# Patient Record
Sex: FEMALE | Race: BLACK OR AFRICAN AMERICAN | Employment: FULL TIME | URBAN - METROPOLITAN AREA
[De-identification: names, ages, dates, MRNs, and addresses within clinical notes are randomized per-mention and may not be internally consistent; named-entity substitution may affect disease eponyms.]

---
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
## 2022-06-10 ENCOUNTER — HOSPITAL ENCOUNTER (EMERGENCY)
Age: 22
Discharge: HOME | End: 2022-06-10
Payer: COMMERCIAL

## 2022-06-10 ENCOUNTER — APPOINTMENT (OUTPATIENT)
Dept: ULTRASOUND IMAGING | Age: 22
End: 2022-06-10
Payer: COMMERCIAL

## 2022-06-10 VITALS
OXYGEN SATURATION: 98 % | TEMPERATURE: 97.3 F | RESPIRATION RATE: 18 BRPM | DIASTOLIC BLOOD PRESSURE: 83 MMHG | HEART RATE: 86 BPM | SYSTOLIC BLOOD PRESSURE: 121 MMHG | WEIGHT: 157.63 LBS | HEIGHT: 64 IN | BODY MASS INDEX: 26.91 KG/M2

## 2022-06-10 DIAGNOSIS — O23.42 URINARY TRACT INFECTION IN MOTHER DURING SECOND TRIMESTER OF PREGNANCY: ICD-10-CM

## 2022-06-10 DIAGNOSIS — R11.0 NAUSEA: ICD-10-CM

## 2022-06-10 DIAGNOSIS — Z3A.14 PREGNANCY WITH 14 COMPLETED WEEKS GESTATION: Primary | ICD-10-CM

## 2022-06-10 LAB
A/G RATIO: 1.4 (ref 1.1–2.2)
ALBUMIN SERPL-MCNC: 4.5 G/DL (ref 3.4–5)
ALP BLD-CCNC: 70 U/L (ref 40–129)
ALT SERPL-CCNC: 13 U/L (ref 10–40)
ANION GAP SERPL CALCULATED.3IONS-SCNC: 13 MMOL/L (ref 3–16)
AST SERPL-CCNC: 12 U/L (ref 15–37)
BACTERIA WET PREP: NORMAL
BACTERIA: ABNORMAL /HPF
BASOPHILS ABSOLUTE: 0 K/UL (ref 0–0.2)
BASOPHILS RELATIVE PERCENT: 0.6 %
BILIRUB SERPL-MCNC: <0.2 MG/DL (ref 0–1)
BILIRUBIN URINE: NEGATIVE
BLOOD, URINE: NEGATIVE
BUN BLDV-MCNC: 4 MG/DL (ref 7–20)
CALCIUM SERPL-MCNC: 9.7 MG/DL (ref 8.3–10.6)
CHLORIDE BLD-SCNC: 104 MMOL/L (ref 99–110)
CLARITY: ABNORMAL
CLUE CELLS: NORMAL
CO2: 21 MMOL/L (ref 21–32)
COLOR: YELLOW
CREAT SERPL-MCNC: <0.5 MG/DL (ref 0.6–1.1)
EOSINOPHILS ABSOLUTE: 0.1 K/UL (ref 0–0.6)
EOSINOPHILS RELATIVE PERCENT: 2 %
EPITHELIAL CELLS WET PREP: NORMAL
EPITHELIAL CELLS, UA: 15 /HPF (ref 0–5)
GFR AFRICAN AMERICAN: >60
GFR NON-AFRICAN AMERICAN: >60
GLUCOSE BLD-MCNC: 81 MG/DL (ref 70–99)
GLUCOSE URINE: NEGATIVE MG/DL
GONADOTROPIN, CHORIONIC (HCG) QUANT: NORMAL MIU/ML
HCT VFR BLD CALC: 37.9 % (ref 36–48)
HEMOGLOBIN: 12.7 G/DL (ref 12–16)
HYALINE CASTS: 0 /LPF (ref 0–8)
KETONES, URINE: NEGATIVE MG/DL
LEUKOCYTE ESTERASE, URINE: ABNORMAL
LYMPHOCYTES ABSOLUTE: 1.4 K/UL (ref 1–5.1)
LYMPHOCYTES RELATIVE PERCENT: 18.7 %
MCH RBC QN AUTO: 28.6 PG (ref 26–34)
MCHC RBC AUTO-ENTMCNC: 33.6 G/DL (ref 31–36)
MCV RBC AUTO: 85.2 FL (ref 80–100)
MICROSCOPIC EXAMINATION: YES
MONOCYTES ABSOLUTE: 0.8 K/UL (ref 0–1.3)
MONOCYTES RELATIVE PERCENT: 10.4 %
NEUTROPHILS ABSOLUTE: 5.1 K/UL (ref 1.7–7.7)
NEUTROPHILS RELATIVE PERCENT: 68.3 %
NITRITE, URINE: POSITIVE
PDW BLD-RTO: 14.4 % (ref 12.4–15.4)
PH UA: 6.5 (ref 5–8)
PLATELET # BLD: 219 K/UL (ref 135–450)
PMV BLD AUTO: 9.2 FL (ref 5–10.5)
POTASSIUM SERPL-SCNC: 3.4 MMOL/L (ref 3.5–5.1)
PROTEIN UA: NEGATIVE MG/DL
RBC # BLD: 4.45 M/UL (ref 4–5.2)
RBC UA: 1 /HPF (ref 0–4)
RBC WET PREP: NORMAL
SODIUM BLD-SCNC: 138 MMOL/L (ref 136–145)
SOURCE WET PREP: NORMAL
SPECIFIC GRAVITY UA: 1.02 (ref 1–1.03)
TOTAL PROTEIN: 7.7 G/DL (ref 6.4–8.2)
TRICHOMONAS PREP: NORMAL
URINE REFLEX TO CULTURE: YES
URINE TYPE: ABNORMAL
UROBILINOGEN, URINE: 1 E.U./DL
WBC # BLD: 7.5 K/UL (ref 4–11)
WBC UA: 38 /HPF (ref 0–5)
WBC WET PREP: NORMAL
YEAST WET PREP: NORMAL

## 2022-06-10 PROCEDURE — 99284 EMERGENCY DEPT VISIT MOD MDM: CPT

## 2022-06-10 PROCEDURE — 87591 N.GONORRHOEAE DNA AMP PROB: CPT

## 2022-06-10 PROCEDURE — 36415 COLL VENOUS BLD VENIPUNCTURE: CPT

## 2022-06-10 PROCEDURE — 96374 THER/PROPH/DIAG INJ IV PUSH: CPT

## 2022-06-10 PROCEDURE — 87210 SMEAR WET MOUNT SALINE/INK: CPT

## 2022-06-10 PROCEDURE — 84702 CHORIONIC GONADOTROPIN TEST: CPT

## 2022-06-10 PROCEDURE — 6360000002 HC RX W HCPCS: Performed by: PHYSICIAN ASSISTANT

## 2022-06-10 PROCEDURE — 85025 COMPLETE CBC W/AUTO DIFF WBC: CPT

## 2022-06-10 PROCEDURE — 80053 COMPREHEN METABOLIC PANEL: CPT

## 2022-06-10 PROCEDURE — 87077 CULTURE AEROBIC IDENTIFY: CPT

## 2022-06-10 PROCEDURE — 76805 OB US >/= 14 WKS SNGL FETUS: CPT

## 2022-06-10 PROCEDURE — 87491 CHLMYD TRACH DNA AMP PROBE: CPT

## 2022-06-10 PROCEDURE — 81001 URINALYSIS AUTO W/SCOPE: CPT

## 2022-06-10 PROCEDURE — 87086 URINE CULTURE/COLONY COUNT: CPT

## 2022-06-10 RX ORDER — PNV NO.95/FERROUS FUM/FOLIC AC 28MG-0.8MG
1 TABLET ORAL DAILY
Qty: 30 TABLET | Refills: 0 | Status: SHIPPED | OUTPATIENT
Start: 2022-06-10

## 2022-06-10 RX ORDER — NITROFURANTOIN 25; 75 MG/1; MG/1
100 CAPSULE ORAL 2 TIMES DAILY
Qty: 20 CAPSULE | Refills: 0 | Status: SHIPPED | OUTPATIENT
Start: 2022-06-10 | End: 2022-06-10 | Stop reason: CLARIF

## 2022-06-10 RX ORDER — ONDANSETRON 2 MG/ML
4 INJECTION INTRAMUSCULAR; INTRAVENOUS ONCE
Status: COMPLETED | OUTPATIENT
Start: 2022-06-10 | End: 2022-06-10

## 2022-06-10 RX ORDER — ONDANSETRON 4 MG/1
4 TABLET, ORALLY DISINTEGRATING ORAL EVERY 12 HOURS PRN
Qty: 12 TABLET | Refills: 0 | Status: SHIPPED | OUTPATIENT
Start: 2022-06-10

## 2022-06-10 RX ORDER — CEFUROXIME AXETIL 250 MG/1
250 TABLET ORAL 2 TIMES DAILY
Qty: 10 TABLET | Refills: 0 | Status: SHIPPED | OUTPATIENT
Start: 2022-06-10 | End: 2022-06-15

## 2022-06-10 RX ADMIN — ONDANSETRON 4 MG: 2 INJECTION INTRAMUSCULAR; INTRAVENOUS at 17:02

## 2022-06-10 ASSESSMENT — ENCOUNTER SYMPTOMS
COUGH: 0
SHORTNESS OF BREATH: 0
ABDOMINAL PAIN: 1
EYE PAIN: 0
BACK PAIN: 0
VOMITING: 0
SORE THROAT: 0
NAUSEA: 1

## 2022-06-10 ASSESSMENT — PATIENT HEALTH QUESTIONNAIRE - PHQ9: SUM OF ALL RESPONSES TO PHQ QUESTIONS 1-9: 17

## 2022-06-10 ASSESSMENT — PAIN DESCRIPTION - ORIENTATION: ORIENTATION: LEFT;LOWER

## 2022-06-10 ASSESSMENT — PAIN - FUNCTIONAL ASSESSMENT
PAIN_FUNCTIONAL_ASSESSMENT: 0-10
PAIN_FUNCTIONAL_ASSESSMENT: 0-10

## 2022-06-10 ASSESSMENT — LIFESTYLE VARIABLES: HOW OFTEN DO YOU HAVE A DRINK CONTAINING ALCOHOL: NEVER

## 2022-06-10 ASSESSMENT — PAIN SCALES - GENERAL
PAINLEVEL_OUTOF10: 5
PAINLEVEL_OUTOF10: 5

## 2022-06-10 ASSESSMENT — PAIN DESCRIPTION - LOCATION
LOCATION: ABDOMEN
LOCATION: ABDOMEN

## 2022-06-10 NOTE — ED PROVIDER NOTES
**ADVANCED PRACTICE PROVIDER, I HAVE EVALUATED THIS PATIENT**        [address] ENCOUNTER      Pt Name: Blake Lakhani  YZZ:[id number]  [place] 2000  Date of evaluation: 6/10/2022  Provider: Nikolay Pickett PA-C      Chief Complaint:    Chief Complaint   Patient presents with    Abdominal Pain     LLQ abdominal pain intermittently X 2 weeks LMP  approximately  estimated 16 weeks pregnancy no prenatal care.  Vaginal Itching     irritation for almost a week and a half, with discharge, unsure of std concern         Nursing Notes, Past Medical Hx, Past Surgical Hx, Social Hx, Allergies, and Family Hx were all reviewed and agreed with or any disagreements were addressed in the HPI.    HPI: (Location, Duration, Timing, Severity, Quality, Assoc Sx, Context, Modifying factors)    Chief Complaint of left lower quadrant abdominal pain for the last couple weeks. Also states that she is pregnant. She has had no prenatal care. She has been nauseated. Says she is unsure if she is going to keep this baby. She will this point.  A0 and no miscarriages. Denies chest pain, denies vaginal bleeding, she does complain of vaginal irritation and slight discharge. Denies fever, no weakness, no lightheaded or dizziness. No other complaints. This is a  23 y.o. female who presents to emergency room with the above complaint. PastMedical/Surgical History:  History reviewed. No pertinent past medical history. History reviewed. No pertinent surgical history. Medications:  Previous Medications    No medications on file         Review of Systems:  (2-9 systems needed)  Review of Systems   Constitutional: Negative for chills and fever. HENT: Negative for congestion and sore throat. Eyes: Negative for pain and visual disturbance. Respiratory: Negative for cough and shortness of breath.     Cardiovascular: Negative for chest pain and leg swelling. Gastrointestinal: Positive for abdominal pain and nausea. Negative for vomiting. Genitourinary: Positive for vaginal discharge. Negative for dysuria, frequency, menstrual problem, pelvic pain, vaginal bleeding and vaginal pain. Musculoskeletal: Negative for back pain and neck pain. Skin: Negative for rash and wound. Neurological: Negative for dizziness and light-headedness. \"Positives and Pertinent negatives as per HPI\"    Physical Exam:  Physical Exam  Exam conducted with a chaperone present. Constitutional:       Appearance: She is well-developed. HENT:      Mouth/Throat:      Mouth: Mucous membranes are moist.      Pharynx: Oropharynx is clear. No oropharyngeal exudate or posterior oropharyngeal erythema. Cardiovascular:      Rate and Rhythm: Normal rate and regular rhythm. Heart sounds: Normal heart sounds. No murmur heard. No friction rub. No gallop. Pulmonary:      Effort: Pulmonary effort is normal. No respiratory distress. Breath sounds: Normal breath sounds. No wheezing or rales. Chest:      Chest wall: No tenderness. Abdominal:      General: Abdomen is flat. Bowel sounds are normal. There is no distension. Palpations: There is no mass. Tenderness: There is no abdominal tenderness. There is no rebound. Genitourinary:     Pubic Area: No rash. Labia:         Right: No rash or tenderness. Left: No rash or tenderness. Vagina: No foreign body. No vaginal discharge, erythema, tenderness or bleeding. Cervix: No cervical motion tenderness, discharge, friability, erythema or cervical bleeding. Uterus: Not tender. Adnexa:         Right: No mass or tenderness. Left: No mass or tenderness. Lymphadenopathy:      Lower Body: No right inguinal adenopathy. No left inguinal adenopathy. Neurological:      General: No focal deficit present. Mental Status: She is alert.          MEDICAL DECISION MAKING    Vitals:    Vitals:    06/10/22 1505   BP: 129/86   Pulse: 91   Resp: 16   Temp: 97.1 °F (36.2 °C)   SpO2: 100%   Weight: 157 lb 10.1 oz (71.5 kg)   Height: 5' 4\" (1.626 m)       LABS:  Labs Reviewed   COMPREHENSIVE METABOLIC PANEL - Abnormal; Notable for the following components:       Result Value    Potassium 3.4 (*)     BUN 4 (*)     CREATININE <0.5 (*)     AST 12 (*)     All other components within normal limits   URINALYSIS WITH REFLEX TO CULTURE - Abnormal; Notable for the following components:    Clarity, UA TURBID (*)     Nitrite, Urine POSITIVE (*)     Leukocyte Esterase, Urine MODERATE (*)     All other components within normal limits   MICROSCOPIC URINALYSIS - Abnormal; Notable for the following components:    Bacteria, UA 4+ (*)     WBC, UA 38 (*)     Epithelial Cells, UA 15 (*)     All other components within normal limits   WET PREP, GENITAL   CULTURE, URINE   C.TRACHOMATIS N.GONORRHOEAE DNA   CBC WITH AUTO DIFFERENTIAL   HCG, QUANTITATIVE, PREGNANCY        Remainder of labs reviewed and were negative at this time or not returned at the time of this note. RADIOLOGY:   Non-plain film images such as CT, Ultrasound and MRI are read by the radiologist. Rika Malcolm PA-C have directly visualized the radiologic plain film image(s) with the below findings:      Interpretation per the Radiologist below, if available at the time of this note:    [place]  [facility]   Final Result   Single live intrauterine pregnancy, estimated age by ultrasound 14 weeks, 5   days. Fetal weight is at the 2nd percentile, for which continued clinical follow-up   is suggested. RECOMMENDATIONS:   Unavailable              No results found.        MEDICAL DECISION MAKING / ED COURSE:      PROCEDURES:   Procedures    None    Patient was given:  Medications   ondansetron (ZOFRAN) injection 4 mg (4 mg IntraVENous Given 6/10/22 1702)     Emergency room course: Patient on exam throat is clear nonerythematous no exudate. Cardiovascular regular rhythm, lungs are clear. No wheeze rales or rhonchi noted. No chest wall tenderness. Abdomen is soft with lower abdominal tenderness with palpation suprapubic area. .  Normal  4 pregnancy. No palpable mass. Normal bowel sounds all 4 quadrant. No CVA or flank tenderness. Full range of motion of extremity. Vaginal exam shows no vaginal bleeding. Cervical is closed. Cervix nonfriable. No cervical motion tenderness with palpation. No adnexal tenderness. No mass palpated no foreign body noted. Labia show no swelling. No lesions noted. Scant whitish vaginal discharge noted no foul odor. Lab result from today shows: Wet prep negative for trichomonas, negative for yeast, negative for clue cells. CBC within normal limits with a white count of 7.5. CMP is unremarkable. Urinalysis show moderate leukocytes, positive for nitrites, negative for blood, negative ketones. Microscopically bacteria is 4+, hyaline casts 0, WBC of 38, RBC of 1 and epithelial cells of 15. Quantitative hCG is 64,780.0. Ultrasound OB 14+ weeks single OB first gestational ultrasound shows a single live intrauterine pregnancy estimated age by ultrasound 14 weeks and 5 days. Fetal weight is at the 2nd percentile for which continue clinical follow-up is suggested. Patient has no OB/GYN doctor at this time. She did go to our clinic upstairs for her first pregnancy I will refer her back to them. I will start her on prenatal vitamins. She is still undecided if she is going to have this baby or not. Told her that she needs to make a decision quickly. I will put her on prenatal vitamins and give her Zofran for nausea for home and Macrobid to treat UTI. She is okay with this plan. She will be discharged stable condition. The patient tolerated their visit well. I evaluated the patient. The physician was available for consultation as needed.   The patient and / or the family were informed of the results of any tests, a time was given to answer questions, a plan was proposed and they agreed with plan. CLINICAL IMPRESSION:  1. Pregnancy with 14 completed weeks gestation    2. Urinary tract infection in mother during second trimester of pregnancy    3. Nausea        DISPOSITION        PATIENT REFERRED TO:  No follow-up provider specified.     DISCHARGE MEDICATIONS:  New Prescriptions    CEFUROXIME (CEFTIN) 250 MG TABLET    Take 1 tablet by mouth 2 times daily for 5 days    ONDANSETRON (ZOFRAN ODT) 4 MG DISINTEGRATING TABLET    Take 1 tablet by mouth every 12 hours as needed for Nausea    PRENATAL VIT-FE FUMARATE-FA (PRENATAL VITAMINS) 28-0.8 MG TABS    Take 1 tablet by mouth daily       DISCONTINUED MEDICATIONS:  Discontinued Medications    ACETAMINOPHEN (TYLENOL) 500 MG TABLET    Take 1 tablet by mouth every 6 hours as needed for Pain    PRENATAL VIT-FE FUMARATE-FA (PRENATAL VITAMIN PLUS LOW IRON) 27-1 MG TABS    Take 1 tablet by mouth daily              (Please note the MDM and HPI sections of this note were completed with a voice recognition program.  Efforts were made to edit the dictations but occasionally words are mis-transcribed.)    Electronically signed, Tani Genao PA-C,          Tani Genao PA-C  06/10/22 1085

## 2022-06-10 NOTE — ED TRIAGE NOTES
Pt admits self to ED with complaint of lower left abdominal pain. Describes as achy and stabbing. Onset was 2 weeks prior. Vaginal discharge (white and foul smell). No problem urinating. Vomiting (can barely keep food down). Denies chest pain, SOB, dizziness.       Concerned of baby's phoebe and also would like to talk to someone about feeling depressed pt doesn't remember if she got it or not

## 2022-06-10 NOTE — ED NOTES
D/C: Order noted for d/c. Pt confirmed d/c paperwork   have correct name. Discharge and education instructions reviewed with patient. Teach-back successful. Pt verbalized understanding and signed d/c papers. Pt denied questions at this time. No acute distress noted. Patient instructed to follow-up as noted - return to emergency department if symptoms worsen. Patient verbalized understanding. Discharged per EDMD with discharge instructions. Pt discharged per self to private vehicle. Patient stable upon departure. Thanked patient for choosing [facility] for care.           Blake Flynn RN  06/10/22 3379

## 2022-06-11 LAB
C TRACH DNA GENITAL QL NAA+PROBE: NEGATIVE
N. GONORRHOEAE DNA: NEGATIVE
ORGANISM: ABNORMAL
URINE CULTURE, ROUTINE: ABNORMAL

## 2022-08-01 ENCOUNTER — HOSPITAL ENCOUNTER (OUTPATIENT)
Age: 22
Discharge: HOME | End: 2022-08-01
Attending: OBSTETRICS & GYNECOLOGY | Admitting: OBSTETRICS & GYNECOLOGY
Payer: COMMERCIAL

## 2022-08-01 ENCOUNTER — HOSPITAL ENCOUNTER (EMERGENCY)
Age: 22
Discharge: HOME | End: 2022-08-01
Payer: COMMERCIAL

## 2022-08-01 VITALS
HEART RATE: 88 BPM | OXYGEN SATURATION: 98 % | HEIGHT: 64 IN | TEMPERATURE: 98.4 F | SYSTOLIC BLOOD PRESSURE: 117 MMHG | BODY MASS INDEX: 28.17 KG/M2 | RESPIRATION RATE: 16 BRPM | WEIGHT: 165 LBS | DIASTOLIC BLOOD PRESSURE: 72 MMHG

## 2022-08-01 VITALS
TEMPERATURE: 98.4 F | SYSTOLIC BLOOD PRESSURE: 134 MMHG | RESPIRATION RATE: 17 BRPM | BODY MASS INDEX: 28.38 KG/M2 | HEART RATE: 87 BPM | OXYGEN SATURATION: 98 % | WEIGHT: 165.34 LBS | DIASTOLIC BLOOD PRESSURE: 80 MMHG

## 2022-08-01 DIAGNOSIS — O26.892 PELVIC PAIN AFFECTING PREGNANCY IN SECOND TRIMESTER, ANTEPARTUM: Primary | ICD-10-CM

## 2022-08-01 DIAGNOSIS — R10.2 PELVIC PAIN AFFECTING PREGNANCY IN SECOND TRIMESTER, ANTEPARTUM: Primary | ICD-10-CM

## 2022-08-01 LAB
BACTERIA: ABNORMAL /HPF
BILIRUBIN URINE: NEGATIVE
BLOOD, URINE: NEGATIVE
CLARITY: ABNORMAL
COLOR: YELLOW
EPITHELIAL CELLS, UA: 16 /HPF (ref 0–5)
GLUCOSE URINE: NEGATIVE MG/DL
HYALINE CASTS: 0 /LPF (ref 0–8)
KETONES, URINE: NEGATIVE MG/DL
LEUKOCYTE ESTERASE, URINE: ABNORMAL
MICROSCOPIC EXAMINATION: YES
NITRITE, URINE: NEGATIVE
PH UA: 7 (ref 5–8)
PROTEIN UA: NEGATIVE MG/DL
RBC UA: 1 /HPF (ref 0–4)
SPECIFIC GRAVITY UA: 1.01 (ref 1–1.03)
URINE REFLEX TO CULTURE: YES
URINE TYPE: ABNORMAL
UROBILINOGEN, URINE: 1 E.U./DL
WBC UA: 15 /HPF (ref 0–5)

## 2022-08-01 PROCEDURE — 99204 OFFICE O/P NEW MOD 45 MIN: CPT

## 2022-08-01 PROCEDURE — 87086 URINE CULTURE/COLONY COUNT: CPT

## 2022-08-01 PROCEDURE — 99282 EMERGENCY DEPT VISIT SF MDM: CPT

## 2022-08-01 PROCEDURE — 81001 URINALYSIS AUTO W/SCOPE: CPT

## 2022-08-01 ASSESSMENT — PAIN DESCRIPTION - ORIENTATION: ORIENTATION: LOWER

## 2022-08-01 ASSESSMENT — PAIN - FUNCTIONAL ASSESSMENT
PAIN_FUNCTIONAL_ASSESSMENT: 0-10
PAIN_FUNCTIONAL_ASSESSMENT: PREVENTS OR INTERFERES SOME ACTIVE ACTIVITIES AND ADLS

## 2022-08-01 ASSESSMENT — PAIN SCALES - GENERAL: PAINLEVEL_OUTOF10: 5

## 2022-08-01 ASSESSMENT — ENCOUNTER SYMPTOMS
DIARRHEA: 0
NAUSEA: 0
BACK PAIN: 0
VOMITING: 0
ABDOMINAL PAIN: 1

## 2022-08-01 ASSESSMENT — PAIN DESCRIPTION - LOCATION: LOCATION: ABDOMEN

## 2022-08-01 ASSESSMENT — PAIN DESCRIPTION - DESCRIPTORS: DESCRIPTORS: ACHING

## 2022-08-01 NOTE — ED NOTES
Pt will be going to the OB floor. Ob providers aware .  They will be by to pick pt up        Alex Peterson RN  08/01/22 3192

## 2022-08-01 NOTE — ED PROVIDER NOTES
[address] [facility]      Pt Name: Merly Zamora  MRN: [id number]  [place] 2000  Date of evaluation: 8/1/2022  Provider: Marleni Ya PA-C    This patient was not seen and evaluated by the attending physician No att. providers found. CHIEF COMPLAINT      Chief Complaint: pelvic pain in pregnancy      HISTORYOF PRESENT ILLNESS  (Location/Symptom, Timing/Onset, Context/Setting, Quality, Duration, Modifying Factors, Severity.)   Merly Zamora is a 23 y.o. female who presents to the emergency department complaining of pelvic pain. The patient is G2, P1 and 22 weeks pregnant. She has not had any prenatal care other than a one-time emergency room visit on 6/10 where she was found to be 14 weeks 5 days pregnant and have a UTI. She never ended up following up with OB/GYN. She tells me she had a trouble getting a ride and she has had a lot of things going on at home which prevented her from receiving care for this pregnancy. She says she has had 2 weeks of pelvic pain. It is intermittent. Nothing makes it better or worse. She rates a 5 out of 10. Reports associated vaginal pain. Denies any vaginal bleeding, leakage of fluid or passage of tissue. Nursing Notes were reviewed and I agree. REVIEW OF SYSTEMS    (2-9 systems for level 4, 10 or more forlevel 5)     Review of Systems   Constitutional:  Negative for appetite change and fever. Gastrointestinal:  Positive for abdominal pain. Negative for diarrhea, nausea and vomiting. Genitourinary:  Positive for pelvic pain and vaginal pain. Negative for dysuria, frequency, genital sores, vaginal bleeding and vaginal discharge. Musculoskeletal:  Negative for back pain. Skin:  Negative for rash. Positives and Pertinent negatives as per HPI. Except as noted above the remainder of the review of systems was reviewed and negative.        PAST MEDICALHISTORY   No past medical history on file. SURGICAL HISTORY     No past surgical history on file. CURRENT MEDICATIONS       Discharge Medication List as of 8/1/2022  7:20 PM        CONTINUE these medications which have NOT CHANGED    Details   Prenatal Vit-Fe Fumarate-FA (PRENATAL VITAMINS) 28-0.8 MG TABS Take 1 tablet by mouth daily, Disp-30 tablet, R-0Print      ondansetron (ZOFRAN ODT) 4 MG disintegrating tablet Take 1 tablet by mouth every 12 hours as needed for Nausea, Disp-12 tablet, R-0Print             ALLERGIES     Patient has no known allergies. FAMILY HISTORY     No family history on file. No family status information on file. SOCIAL HISTORY    reports that she has quit smoking. Her smoking use included cigars. She has never used smokeless tobacco. She reports that she does not currently use alcohol. She reports that she does not use drugs. PHYSICAL EXAM    (up to 7 for level 4, 8 or more for level 5)     ED Triage Vitals [08/01/22 1900]   BP Temp Temp Source Heart Rate Resp SpO2 Height Weight   134/80 98.4 °F (36.9 °C) Tympanic 87 17 98 % -- 165 lb 5.5 oz (75 kg)       Physical Exam  Vitals and nursing note reviewed. Constitutional:       General: She is not in acute distress. Appearance: She is well-developed. HENT:      Head: Normocephalic and atraumatic. Pulmonary:      Effort: Pulmonary effort is normal. No respiratory distress. Abdominal:      Palpations: Abdomen is soft. Tenderness: no abdominal tenderness      Comments: gravid   Genitourinary:     Comments: Deferred, patient will be evaluated at L&D  Musculoskeletal:         General: Normal range of motion. Cervical back: Neck supple. Skin:     General: Skin is warm and dry. Neurological:      Mental Status: She is alert and oriented to person, place, and time.    Psychiatric:         Behavior: Behavior normal.          EMERGENCY DEPARTMENT COURSE and DIFFERENTIAL DIAGNOSIS/MDM:   Vitals:    Vitals:    08/01/22 1900   BP: 134/80 Pulse: 87   Resp: 17   Temp: 98.4 °F (36.9 °C)   TempSrc: Tympanic   SpO2: 98%   Weight: 165 lb 5.5 oz (75 kg)        I have evaluated this patient. My supervising physician was available for consultation. Her abdomen is gravid but nontender. She is 22 weeks pregnant based on her ultrasound on 6/10. Labor and delivery were consulted and the patient was sent there for further evaluation. Is this patient to be included in the SEP-1 Core Measure due to severe sepsis or septic shock? No   Exclusion criteria - the patient is NOT to be included for SEP-1 Core Measure due to:  2+ SIRS criteria are not met    PROCEDURES:  None    FINAL IMPRESSION      1. Pelvic pain affecting pregnancy in second trimester, antepartum          DISPOSITION/PLAN   DISPOSITION Send To L&D 08/01/2022 07:11:11 PM      PATIENT REFERRED TO:  No follow-up provider specified.     MEDICATIONS:  Discharge Medication List as of 8/1/2022  7:20 PM          (Please note that portions of this note were completed with a voice recognition program.  Efforts were made toedit the dictations but occasionally words are mis-transcribed.)    ZACH Jackson PA-C  08/01/22 1921

## 2022-08-02 LAB — URINE CULTURE, ROUTINE: NORMAL

## 2022-08-02 NOTE — DISCHARGE INSTRUCTIONS
Home Undelivered Discharge Instructions    After Discharge Orders:            Diet:  normal diet as tolerated    Rest: normal activity as tolerated        Diet/Activity/Restrictions:  Regular  Limit caffeine consumption  Increase your fluid intake  Eat small meals-but often. Rise from laying/sitting position to standing slowly. Avoid laying on your back, sidelying positions are best, left side is preferred. Weigh yourself daily and write down what you weigh. If you had a vaginal exam you may have some bloody mucous or brown vaginal discharge. If your doctor/midwife has ordered antibiotics, get it filled right away and take all of the medication as it has been prescribed. When to call your doctor: If you have severe back pain. Period-like cramps that may come and go. May feel like baby is balling up. Low, dull backache, not relieved by rest.  Pressure in your vagina or lower abdomen that may feel like the baby is pushing down. Change in the type or amount of vaginal discharge. Abdominal cramps that may be accompanied by diarrhea. 4-5 uterine contractions in one hour. Fluid leaking from your vagina (may or may not be bloody)  If you have vaginal bleeding. If you have a temperature of 100.6 or more. If your baby has a decrease in activity. Less than 5 times in an hour. If you feel you are not getting better or you are concerned. If you develop a headache, not resolved with your usual interventions. If you have changes in your vision (blurring or spots in your vision). If you have burning with urination, urgency or frequency. If you have pain in your abdomen, up by your ribs.                 General Instructions:    Nausea and Vomiting  Keep dry toast/crackers with you to munch on  Eat small frequent meals  Try to keep something in your stomach (don't let your stomach get empty)  Take your time getting up  Avoid smells that make you feel sick    Travel  Wear seatbelts or safety/lap belts  Walk frequently, every 1-2 hours  Wear clothing that does not constrict and comfortable shoes  Keep a light snack (e.g. Dry crackers) with you at all times to prevent nausea  Drink plenty of water, low sodium and noncaffeinated drinks. DO NOT take any medication that is not approved by your physician first    Swelling (Edema)  Avoid standing for long periods, keep legs up when you can  When resting, lie on your side (left side is best)  Limit the amount of salty foods you eat  Try to wear support hose as much as possible    Exercise  Avoid situations that would cause you to become overheated  Exercise outdoors only if the weather is reasonable and not too hot  Do not over exert yourself when you exercise  Drink plenty of fluids, especially water  Wear good support hose, bra and shoes when exercising    Varicose Veins  Try to keep your legs elevated when you are sitting  When lying down, keep your legs elevated  Do not stand for long periods of time  When wearing stockings or socks, make sure they are not too tight and bind your legs  Wear support hose/stockings at all times  If you have a job where you sit a lot, get up periodically and walk around      Call physician or midwife, return to Labor and Delivery, call 911, or go to the nearest Emergency Room if: increased leakage or fluid, contractions more than  6 per  1 hour, decreased fetal movement, persistent low back pain or cramping, bleeding from vaginal area, difficulty urinating, pain with urination, difficulty breathing, new calf pain, persistent headache, or vision change.

## 2022-08-02 NOTE — FLOWSHEET NOTE
Dr. Sera Vasquez called and updated on lab results. OK to d/c home. Encourage to keep new OB appt next week. Take Tylenol for discomfort.

## 2022-08-02 NOTE — FLOWSHEET NOTE
Pt presents to L&D at 22.1 weeks with complaints of side/pelvic and pubic pain. It is intermittent and started about \"2 weeks ago\". She denies any VB or ctx. Has felt some fetal movement. Oriented to room, call light in reach. Dr. Orlando Umanzor aware of pt, order to send urine for UA and call with results.

## 2022-08-02 NOTE — FLOWSHEET NOTE
After completion of the Medical Screening Evaluation, it has been determined that a medical emergency does not exist and the patient is not in active labor, and therefore the patient may be discharged home. Discharge instructions and warning signs reviewed with pt. Encouraged to attend OB appt next week and take Tylenol as directed. Pt states understanding and denies any questions/concerns. Signed and copy given to pt. Pt ambulated off unit with no difficulties.

## 2022-10-03 LAB
HEP B, EXTERNAL RESULT: NEGATIVE
HIV, EXTERNAL RESULT: NORMAL
RUBELLA TITER, EXTERNAL RESULT: NEGATIVE

## 2022-11-17 ENCOUNTER — HOSPITAL ENCOUNTER (OUTPATIENT)
Age: 22
Discharge: HOME | End: 2022-11-17
Attending: OBSTETRICS & GYNECOLOGY | Admitting: OBSTETRICS & GYNECOLOGY
Payer: COMMERCIAL

## 2022-11-17 VITALS
BODY MASS INDEX: 30.39 KG/M2 | WEIGHT: 178 LBS | RESPIRATION RATE: 16 BRPM | TEMPERATURE: 98.6 F | DIASTOLIC BLOOD PRESSURE: 86 MMHG | OXYGEN SATURATION: 99 % | SYSTOLIC BLOOD PRESSURE: 130 MMHG | HEART RATE: 106 BPM | HEIGHT: 64 IN

## 2022-11-17 PROCEDURE — 99214 OFFICE O/P EST MOD 30 MIN: CPT

## 2022-11-17 PROCEDURE — 59025 FETAL NON-STRESS TEST: CPT

## 2022-11-18 NOTE — FLOWSHEET NOTE
11/17/22 2100   Fetal Heart Rate   Mode External US   Baseline Rate 140 bpm   Baseline Classification Normal   Variability 6-25 BPM   Pattern Accelerations   Patient Feels Fetal Movement Yes   Interventions RN at Bedside;Provider at Bedside;Sterile Vaginal Exam Performed   OB Bladder Status Non-distended;Voiding   OB Bladder Intervention Voiding with Relief   Fetal Monitoring Strip   FMS Reviewed? Yes   FMS Reviewed By? DANIELLE/CRUZITO/Dr Hong   Uterine Activity   UA Mode Palpation; [place]   Contraction Frequency 1 noted   Contraction Duration 60   Contraction Intensity Mild   Resting Tone Palpated Soft

## 2022-11-18 NOTE — FLOWSHEET NOTE
Pt presents to triage at 37.4 weeks with c/o \"leaking fluid for 3 days\". She denies any VB or ctx. Endorses good fetal movement. Placed on EFM, tracing to central bank. Oriented to room, call light in reach.

## 2022-11-18 NOTE — PROGRESS NOTES
@ 37wks c/o LOF  FHT-140 reactive  [place]-occ ctx  Cx-5cm per RN (no change from office)  SSE-no pool, neg valsalva, mucus dc  [place]-neg  DC. Precautions. RTC as scheduled.

## 2022-11-18 NOTE — DISCHARGE INSTRUCTIONS
Home Undelivered Discharge Instructions    After Discharge Orders:            Diet:  normal diet as tolerated    Rest: normal activity as tolerated\        Diet/Activity/Restrictions:  Regular  Limit caffeine consumption  Increase your fluid intake  Eat small meals-but often. Rise from laying/sitting position to standing slowly. Avoid laying on your back, sidelying positions are best, left side is preferred. Weigh yourself daily and write down what you weigh. If you had a vaginal exam you may have some bloody mucous or brown vaginal discharge. If your doctor/midwife has ordered antibiotics, get it filled right away and take all of the medication as it has been prescribed. When to call your doctor: If you have severe back pain. Period-like cramps that may come and go. May feel like baby is balling up. Low, dull backache, not relieved by rest.  Pressure in your vagina or lower abdomen that may feel like the baby is pushing down. Change in the type or amount of vaginal discharge. Abdominal cramps that may be accompanied by diarrhea. 4-5 uterine contractions in one hour. Fluid leaking from your vagina (may or may not be bloody)  If you have vaginal bleeding. If you have a temperature of 100.6 or more. If your baby has a decrease in activity. Less than 5 times in an hour. If you feel you are not getting better or you are concerned. If you develop a headache, not resolved with your usual interventions. If you have changes in your vision (blurring or spots in your vision). If you have burning with urination, urgency or frequency. If you have pain in your abdomen, up by your ribs.                 General Instructions:    Nausea and Vomiting  Keep dry toast/crackers with you to munch on  Eat small frequent meals  Try to keep something in your stomach (don't let your stomach get empty)  Take your time getting up  Avoid smells that make you feel sick    Travel  Wear seatbelts or safety/lap belts  Walk frequently, every 1-2 hours  Wear clothing that does not constrict and comfortable shoes  Keep a light snack (e.g. Dry crackers) with you at all times to prevent nausea  Drink plenty of water, low sodium and noncaffeinated drinks. DO NOT take any medication that is not approved by your physician first    Swelling (Edema)  Avoid standing for long periods, keep legs up when you can  When resting, lie on your side (left side is best)  Limit the amount of salty foods you eat  Try to wear support hose as much as possible    Exercise  Avoid situations that would cause you to become overheated  Exercise outdoors only if the weather is reasonable and not too hot  Do not over exert yourself when you exercise  Drink plenty of fluids, especially water  Wear good support hose, bra and shoes when exercising    Varicose Veins  Try to keep your legs elevated when you are sitting  When lying down, keep your legs elevated  Do not stand for long periods of time  When wearing stockings or socks, make sure they are not too tight and bind your legs  Wear support hose/stockings at all times  If you have a job where you sit a lot, get up periodically and walk around      Other instructions: Do kick counts once a day on your baby. Choose the time of day your baby is most active. Get in a comfortable lying or sitting position and time how long it takes to feel 10 kicks, twists, turns, swishes, or rolls. Call your physician or midwife if there have not been 10 kicks in 2 hours    Call physician or midwife, return to Labor and Delivery, call 911, or go to the nearest Emergency Room if: increased leakage or fluid, decreased fetal movement, persistent low back pain or cramping, bleeding from vaginal area, difficulty urinating, pain with urination, difficulty breathing, new calf pain, persistent headache, vision change, or regular uterine contractions every 5 minutes x1 hour.          Fetal Movement Chart    A daily diary of your baby's movements provides useful information. Please beatris down anytime the baby moves during at least one convenient hour each day. Pick an hour when the baby is usually active. It is also important that you have a regular meal within two hours. Edmund Harper on your left side, in this position the circulation to the baby is improved, and count the number of movements. If the baby moves less than 5-6 times in an hour, or you are concerned about you or your baby call your doctor or midwife.         Date Time Counts Total Date Time Counts Total

## 2022-11-18 NOTE — FLOWSHEET NOTE
After completion of the Medical Screening Evaluation, it has been determined that a medical emergency does not exist and the patient is not in active labor, and therefore the patient may be discharged home per Dr Madhu Purvis. Discharge paperwork, warning signs and kick counts reviewed with pt. Denies any questions or concerns at this time. Signed and copy given to pt. Pt ambulated off unit with no difficulties.

## 2022-11-26 ENCOUNTER — HOSPITAL ENCOUNTER (OUTPATIENT)
Age: 22
Discharge: HOME | End: 2022-11-26
Attending: OBSTETRICS & GYNECOLOGY | Admitting: OBSTETRICS & GYNECOLOGY
Payer: COMMERCIAL

## 2022-11-26 VITALS
SYSTOLIC BLOOD PRESSURE: 135 MMHG | WEIGHT: 178 LBS | BODY MASS INDEX: 30.39 KG/M2 | DIASTOLIC BLOOD PRESSURE: 84 MMHG | HEIGHT: 64 IN | HEART RATE: 81 BPM | OXYGEN SATURATION: 98 % | TEMPERATURE: 98.3 F | RESPIRATION RATE: 18 BRPM

## 2022-11-26 PROCEDURE — 59025 FETAL NON-STRESS TEST: CPT

## 2022-11-26 PROCEDURE — 6370000000 HC RX 637 (ALT 250 FOR IP)

## 2022-11-26 PROCEDURE — 99213 OFFICE O/P EST LOW 20 MIN: CPT

## 2022-11-26 RX ORDER — ACETAMINOPHEN 500 MG
1000 TABLET ORAL EVERY 6 HOURS PRN
Status: DISCONTINUED | OUTPATIENT
Start: 2022-11-26 | End: 2022-11-26

## 2022-11-26 RX ORDER — ACETAMINOPHEN 500 MG
1000 TABLET ORAL EVERY 6 HOURS PRN
Status: DISCONTINUED | OUTPATIENT
Start: 2022-11-26 | End: 2022-11-26 | Stop reason: HOSPADM

## 2022-11-26 RX ORDER — ACETAMINOPHEN 500 MG
TABLET ORAL
Status: COMPLETED
Start: 2022-11-26 | End: 2022-11-26

## 2022-11-26 RX ADMIN — Medication 1000 MG: at 01:10

## 2022-11-26 RX ADMIN — ACETAMINOPHEN 1000 MG: 500 TABLET ORAL at 01:10

## 2022-11-26 ASSESSMENT — PAIN DESCRIPTION - DESCRIPTORS: DESCRIPTORS: DISCOMFORT;ACHING

## 2022-11-26 ASSESSMENT — PAIN SCALES - GENERAL: PAINLEVEL_OUTOF10: 9

## 2022-11-26 NOTE — FLOWSHEET NOTE
11/26/22 0152   Fetal Heart Rate   Mode External US   Baseline Rate 135 bpm   Baseline Classification Normal   Variability 6-25 BPM   Pattern Accelerations   Patient Feels Fetal Movement Yes   Interventions RN at Bedside   OB Bladder Status Non-distended;Voiding   OB Bladder Intervention Voiding with Relief   Multiple birth? No   Fetal Monitoring Strip   FMS Reviewed? Yes   FMS Reviewed By? DANIELLE/Dr eDisy Peters   Uterine Activity   UA Mode Palpation; [place]   Contractions Irregular   Contraction Frequency 3-11   Contraction Duration    Contraction Intensity Mild   Resting Tone Palpated Soft

## 2022-11-26 NOTE — FLOWSHEET NOTE
Pt presents to L&D triage via squad with c/o pelvic pressure. She states it is constant and started yesterday, but increased in intensity tonight. She has not taken any medications for pain. She denies ay ctx, VB or LOF. She endorses good fetal movement. Placed on EFM, tracing to central bank.

## 2022-11-26 NOTE — FLOWSHEET NOTE
After completion of the Medical Screening Evaluation, it has been determined that a medical emergency does not exist and the patient is not in active labor, and therefore the patient may be discharged home. Discharge instructions, kick counts and warning signs reviewed. Educated on comfort measures - warm compresses, rest, fluids and Tylenol encouraged. Pt has scheduled IOL on 11/28/22 @ 0300. Also educated to call office/on-call provider if any concerns or questions arise. Understanding stated. D/c instructions signed and copy given to pt. Pt ambulated off unit.

## 2022-11-26 NOTE — DISCHARGE INSTRUCTIONS
Home Undelivered Discharge Instructions    After Discharge Orders:            Diet:  normal diet as tolerated    Rest: normal activity as tolerated\        Diet/Activity/Restrictions:  Regular  Limit caffeine consumption  Increase your fluid intake  Eat small meals-but often. Rise from laying/sitting position to standing slowly. Avoid laying on your back, sidelying positions are best, left side is preferred. Weigh yourself daily and write down what you weigh. If you had a vaginal exam you may have some bloody mucous or brown vaginal discharge. If your doctor/midwife has ordered antibiotics, get it filled right away and take all of the medication as it has been prescribed. When to call your doctor: If you have severe back pain. Period-like cramps that may come and go. May feel like baby is balling up. Low, dull backache, not relieved by rest.  Pressure in your vagina or lower abdomen that may feel like the baby is pushing down. Change in the type or amount of vaginal discharge. Abdominal cramps that may be accompanied by diarrhea. 4-5 uterine contractions in one hour. Fluid leaking from your vagina (may or may not be bloody)  If you have vaginal bleeding. If you have a temperature of 100.6 or more. If your baby has a decrease in activity. Less than 5 times in an hour. If you feel you are not getting better or you are concerned. If you develop a headache, not resolved with your usual interventions. If you have changes in your vision (blurring or spots in your vision). If you have burning with urination, urgency or frequency. If you have pain in your abdomen, up by your ribs.                 General Instructions:    Nausea and Vomiting  Keep dry toast/crackers with you to munch on  Eat small frequent meals  Try to keep something in your stomach (don't let your stomach get empty)  Take your time getting up  Avoid smells that make you feel sick    Travel  Wear seatbelts or safety/lap belts  Walk frequently, every 1-2 hours  Wear clothing that does not constrict and comfortable shoes  Keep a light snack (e.g. Dry crackers) with you at all times to prevent nausea  Drink plenty of water, low sodium and noncaffeinated drinks. DO NOT take any medication that is not approved by your physician first    Swelling (Edema)  Avoid standing for long periods, keep legs up when you can  When resting, lie on your side (left side is best)  Limit the amount of salty foods you eat  Try to wear support hose as much as possible    Exercise  Avoid situations that would cause you to become overheated  Exercise outdoors only if the weather is reasonable and not too hot  Do not over exert yourself when you exercise  Drink plenty of fluids, especially water  Wear good support hose, bra and shoes when exercising    Varicose Veins  Try to keep your legs elevated when you are sitting  When lying down, keep your legs elevated  Do not stand for long periods of time  When wearing stockings or socks, make sure they are not too tight and bind your legs  Wear support hose/stockings at all times  If you have a job where you sit a lot, get up periodically and walk around      Other instructions: Do kick counts once a day on your baby. Choose the time of day your baby is most active. Get in a comfortable lying or sitting position and time how long it takes to feel 10 kicks, twists, turns, swishes, or rolls. Call your physician or midwife if there have not been 10 kicks in 2 hours    Call physician or midwife, return to Labor and Delivery, call 911, or go to the nearest Emergency Room if: increased leakage or fluid, decreased fetal movement, persistent low back pain or cramping, bleeding from vaginal area, difficulty urinating, pain with urination, difficulty breathing, new calf pain, persistent headache, vision change, or regular contractions at least 5 minutes apart.        Fetal Movement Chart    A daily diary of your baby's movements provides useful information. Please beatris down anytime the baby moves during at least one convenient hour each day. Pick an hour when the baby is usually active. It is also important that you have a regular meal within two hours. Phuc Campos on your left side, in this position the circulation to the baby is improved, and count the number of movements. If the baby moves less than 5-6 times in an hour, or you are concerned about you or your baby call your doctor or midwife.         Date Time Counts Total Date Time Counts Total

## 2022-11-27 ENCOUNTER — ANESTHESIA EVENT (OUTPATIENT)
Dept: LABOR AND DELIVERY | Age: 22
DRG: 560 | End: 2022-11-27
Payer: COMMERCIAL

## 2022-11-27 ENCOUNTER — HOSPITAL ENCOUNTER (INPATIENT)
Age: 22
LOS: 3 days | Discharge: HOME | DRG: 560 | End: 2022-11-30
Attending: OBSTETRICS & GYNECOLOGY | Admitting: OBSTETRICS & GYNECOLOGY
Payer: COMMERCIAL

## 2022-11-27 ENCOUNTER — ANESTHESIA (OUTPATIENT)
Dept: LABOR AND DELIVERY | Age: 22
DRG: 560 | End: 2022-11-27
Payer: COMMERCIAL

## 2022-11-27 LAB
ABO/RH: NORMAL
AMPHETAMINE SCREEN, URINE: NORMAL
ANTIBODY SCREEN: NORMAL
BARBITURATE SCREEN URINE: NORMAL
BASOPHILS ABSOLUTE: 0.1 K/UL (ref 0–0.2)
BASOPHILS RELATIVE PERCENT: 0.6 %
BENZODIAZEPINE SCREEN, URINE: NORMAL
BUPRENORPHINE URINE: NORMAL
CANNABINOID SCREEN URINE: NORMAL
COCAINE METABOLITE SCREEN URINE: NORMAL
EOSINOPHILS ABSOLUTE: 0.2 K/UL (ref 0–0.6)
EOSINOPHILS RELATIVE PERCENT: 1.8 %
FENTANYL SCREEN, URINE: NORMAL
HCT VFR BLD CALC: 31.4 % (ref 36–48)
HEMOGLOBIN: 9.7 G/DL (ref 12–16)
LYMPHOCYTES ABSOLUTE: 1.9 K/UL (ref 1–5.1)
LYMPHOCYTES RELATIVE PERCENT: 20.7 %
Lab: NORMAL
MCH RBC QN AUTO: 23 PG (ref 26–34)
MCHC RBC AUTO-ENTMCNC: 31 G/DL (ref 31–36)
MCV RBC AUTO: 74.4 FL (ref 80–100)
METHADONE SCREEN, URINE: NORMAL
MONOCYTES ABSOLUTE: 0.7 K/UL (ref 0–1.3)
MONOCYTES RELATIVE PERCENT: 7.1 %
NEUTROPHILS ABSOLUTE: 6.6 K/UL (ref 1.7–7.7)
NEUTROPHILS RELATIVE PERCENT: 69.8 %
OPIATE SCREEN URINE: NORMAL
OXYCODONE URINE: NORMAL
PDW BLD-RTO: 17.1 % (ref 12.4–15.4)
PH UA: 7
PHENCYCLIDINE SCREEN URINE: NORMAL
PLATELET # BLD: 173 K/UL (ref 135–450)
PLATELET SLIDE REVIEW: ADEQUATE
PMV BLD AUTO: 10 FL (ref 5–10.5)
RBC # BLD: 4.22 M/UL (ref 4–5.2)
SLIDE REVIEW: ABNORMAL
WBC # BLD: 9.4 K/UL (ref 4–11)

## 2022-11-27 PROCEDURE — 80307 DRUG TEST PRSMV CHEM ANLYZR: CPT

## 2022-11-27 PROCEDURE — 1220000000 HC SEMI PRIVATE OB R&B

## 2022-11-27 PROCEDURE — 86780 TREPONEMA PALLIDUM: CPT

## 2022-11-27 PROCEDURE — 59025 FETAL NON-STRESS TEST: CPT

## 2022-11-27 PROCEDURE — 85025 COMPLETE CBC W/AUTO DIFF WBC: CPT

## 2022-11-27 PROCEDURE — 86900 BLOOD TYPING SEROLOGIC ABO: CPT

## 2022-11-27 PROCEDURE — 86850 RBC ANTIBODY SCREEN: CPT

## 2022-11-27 PROCEDURE — 86901 BLOOD TYPING SEROLOGIC RH(D): CPT

## 2022-11-27 RX ORDER — SODIUM CHLORIDE, SODIUM LACTATE, POTASSIUM CHLORIDE, AND CALCIUM CHLORIDE .6; .31; .03; .02 G/100ML; G/100ML; G/100ML; G/100ML
1000 INJECTION, SOLUTION INTRAVENOUS PRN
Status: DISCONTINUED | OUTPATIENT
Start: 2022-11-27 | End: 2022-11-28

## 2022-11-27 RX ORDER — TERBUTALINE SULFATE 1 MG/ML
0.25 INJECTION, SOLUTION SUBCUTANEOUS
Status: DISCONTINUED | OUTPATIENT
Start: 2022-11-27 | End: 2022-11-28

## 2022-11-27 RX ORDER — ONDANSETRON 2 MG/ML
4 INJECTION INTRAMUSCULAR; INTRAVENOUS EVERY 6 HOURS PRN
Status: DISCONTINUED | OUTPATIENT
Start: 2022-11-27 | End: 2022-11-28

## 2022-11-27 RX ORDER — MISOPROSTOL 200 UG/1
800 TABLET ORAL PRN
Status: DISCONTINUED | OUTPATIENT
Start: 2022-11-27 | End: 2022-11-28

## 2022-11-27 RX ORDER — SODIUM CHLORIDE 0.9 % (FLUSH) 0.9 %
5-40 SYRINGE (ML) INJECTION PRN
Status: DISCONTINUED | OUTPATIENT
Start: 2022-11-27 | End: 2022-11-28

## 2022-11-27 RX ORDER — CARBOPROST TROMETHAMINE 250 UG/ML
250 INJECTION, SOLUTION INTRAMUSCULAR PRN
Status: DISCONTINUED | OUTPATIENT
Start: 2022-11-27 | End: 2022-11-28

## 2022-11-27 RX ORDER — SODIUM CHLORIDE, SODIUM LACTATE, POTASSIUM CHLORIDE, CALCIUM CHLORIDE 600; 310; 30; 20 MG/100ML; MG/100ML; MG/100ML; MG/100ML
INJECTION, SOLUTION INTRAVENOUS CONTINUOUS
Status: DISCONTINUED | OUTPATIENT
Start: 2022-11-27 | End: 2022-11-28

## 2022-11-27 RX ORDER — SODIUM CHLORIDE, SODIUM LACTATE, POTASSIUM CHLORIDE, AND CALCIUM CHLORIDE .6; .31; .03; .02 G/100ML; G/100ML; G/100ML; G/100ML
500 INJECTION, SOLUTION INTRAVENOUS PRN
Status: DISCONTINUED | OUTPATIENT
Start: 2022-11-27 | End: 2022-11-28

## 2022-11-27 RX ORDER — ACETAMINOPHEN 325 MG/1
650 TABLET ORAL EVERY 4 HOURS PRN
Status: DISCONTINUED | OUTPATIENT
Start: 2022-11-27 | End: 2022-11-28

## 2022-11-27 RX ORDER — SODIUM CHLORIDE 9 MG/ML
25 INJECTION, SOLUTION INTRAVENOUS PRN
Status: DISCONTINUED | OUTPATIENT
Start: 2022-11-27 | End: 2022-11-28

## 2022-11-27 RX ORDER — METHYLERGONOVINE MALEATE 0.2 MG/ML
200 INJECTION INTRAVENOUS PRN
Status: DISCONTINUED | OUTPATIENT
Start: 2022-11-27 | End: 2022-11-28

## 2022-11-27 RX ORDER — SODIUM CHLORIDE 0.9 % (FLUSH) 0.9 %
5-40 SYRINGE (ML) INJECTION EVERY 12 HOURS SCHEDULED
Status: DISCONTINUED | OUTPATIENT
Start: 2022-11-27 | End: 2022-11-28

## 2022-11-28 LAB — TOTAL SYPHILLIS IGG/IGM: NORMAL

## 2022-11-28 PROCEDURE — 3E033VJ INTRODUCTION OF OTHER HORMONE INTO PERIPHERAL VEIN, PERCUTANEOUS APPROACH: ICD-10-PCS | Performed by: OBSTETRICS & GYNECOLOGY

## 2022-11-28 PROCEDURE — 2580000003 HC RX 258: Performed by: OBSTETRICS & GYNECOLOGY

## 2022-11-28 PROCEDURE — 10907ZC DRAINAGE OF AMNIOTIC FLUID, THERAPEUTIC FROM PRODUCTS OF CONCEPTION, VIA NATURAL OR ARTIFICIAL OPENING: ICD-10-PCS | Performed by: OBSTETRICS & GYNECOLOGY

## 2022-11-28 PROCEDURE — 59025 FETAL NON-STRESS TEST: CPT

## 2022-11-28 PROCEDURE — 2500000003 HC RX 250 WO HCPCS: Performed by: NURSE ANESTHETIST, CERTIFIED REGISTERED

## 2022-11-28 PROCEDURE — 0UQMXZZ REPAIR VULVA, EXTERNAL APPROACH: ICD-10-PCS | Performed by: OBSTETRICS & GYNECOLOGY

## 2022-11-28 PROCEDURE — 6370000000 HC RX 637 (ALT 250 FOR IP): Performed by: OBSTETRICS & GYNECOLOGY

## 2022-11-28 PROCEDURE — 51701 INSERT BLADDER CATHETER: CPT

## 2022-11-28 PROCEDURE — 1220000000 HC SEMI PRIVATE OB R&B

## 2022-11-28 PROCEDURE — 6360000002 HC RX W HCPCS: Performed by: OBSTETRICS & GYNECOLOGY

## 2022-11-28 PROCEDURE — 7200000001 HC VAGINAL DELIVERY

## 2022-11-28 PROCEDURE — 2500000003 HC RX 250 WO HCPCS

## 2022-11-28 PROCEDURE — 3700000025 EPIDURAL BLOCK: Performed by: ANESTHESIOLOGY

## 2022-11-28 RX ORDER — LANOLIN 100 %
OINTMENT (GRAM) TOPICAL PRN
Status: DISCONTINUED | OUTPATIENT
Start: 2022-11-28 | End: 2022-11-30 | Stop reason: HOSPADM

## 2022-11-28 RX ORDER — NALOXONE HYDROCHLORIDE 0.4 MG/ML
INJECTION, SOLUTION INTRAMUSCULAR; INTRAVENOUS; SUBCUTANEOUS PRN
Status: DISCONTINUED | OUTPATIENT
Start: 2022-11-28 | End: 2022-11-28

## 2022-11-28 RX ORDER — OXYCODONE HYDROCHLORIDE 5 MG/1
5 TABLET ORAL EVERY 4 HOURS PRN
Status: DISCONTINUED | OUTPATIENT
Start: 2022-11-28 | End: 2022-11-30 | Stop reason: HOSPADM

## 2022-11-28 RX ORDER — OXYCODONE HYDROCHLORIDE 10 MG/1
10 TABLET ORAL EVERY 4 HOURS PRN
Status: DISCONTINUED | OUTPATIENT
Start: 2022-11-28 | End: 2022-11-30 | Stop reason: HOSPADM

## 2022-11-28 RX ORDER — LIDOCAINE HYDROCHLORIDE AND EPINEPHRINE 15; 5 MG/ML; UG/ML
INJECTION, SOLUTION EPIDURAL PRN
Status: DISCONTINUED | OUTPATIENT
Start: 2022-11-28 | End: 2022-11-28 | Stop reason: SDUPTHER

## 2022-11-28 RX ORDER — BUPIVACAINE HYDROCHLORIDE 2.5 MG/ML
INJECTION, SOLUTION EPIDURAL; INFILTRATION; INTRACAUDAL PRN
Status: DISCONTINUED | OUTPATIENT
Start: 2022-11-28 | End: 2022-11-28 | Stop reason: SDUPTHER

## 2022-11-28 RX ORDER — ACETAMINOPHEN 500 MG
1000 TABLET ORAL EVERY 8 HOURS PRN
Status: DISCONTINUED | OUTPATIENT
Start: 2022-11-28 | End: 2022-11-30 | Stop reason: HOSPADM

## 2022-11-28 RX ORDER — SODIUM CHLORIDE 0.9 % (FLUSH) 0.9 %
5-40 SYRINGE (ML) INJECTION EVERY 12 HOURS SCHEDULED
Status: DISCONTINUED | OUTPATIENT
Start: 2022-11-28 | End: 2022-11-30 | Stop reason: HOSPADM

## 2022-11-28 RX ORDER — LIDOCAINE HYDROCHLORIDE 20 MG/ML
INJECTION, SOLUTION INFILTRATION; PERINEURAL PRN
Status: DISCONTINUED | OUTPATIENT
Start: 2022-11-28 | End: 2022-11-28 | Stop reason: SDUPTHER

## 2022-11-28 RX ORDER — ONDANSETRON 2 MG/ML
4 INJECTION INTRAMUSCULAR; INTRAVENOUS EVERY 6 HOURS PRN
Status: DISCONTINUED | OUTPATIENT
Start: 2022-11-28 | End: 2022-11-28

## 2022-11-28 RX ORDER — SODIUM CHLORIDE 0.9 % (FLUSH) 0.9 %
5-40 SYRINGE (ML) INJECTION PRN
Status: DISCONTINUED | OUTPATIENT
Start: 2022-11-28 | End: 2022-11-30 | Stop reason: HOSPADM

## 2022-11-28 RX ORDER — LIDOCAINE HYDROCHLORIDE 10 MG/ML
INJECTION, SOLUTION INFILTRATION; PERINEURAL PRN
Status: DISCONTINUED | OUTPATIENT
Start: 2022-11-28 | End: 2022-11-28 | Stop reason: SDUPTHER

## 2022-11-28 RX ORDER — TERBUTALINE SULFATE 1 MG/ML
INJECTION, SOLUTION SUBCUTANEOUS
Status: DISCONTINUED
Start: 2022-11-28 | End: 2022-11-28

## 2022-11-28 RX ORDER — IBUPROFEN 800 MG/1
800 TABLET ORAL EVERY 8 HOURS PRN
Status: DISCONTINUED | OUTPATIENT
Start: 2022-11-28 | End: 2022-11-30 | Stop reason: HOSPADM

## 2022-11-28 RX ORDER — SODIUM CHLORIDE 9 MG/ML
INJECTION, SOLUTION INTRAVENOUS PRN
Status: DISCONTINUED | OUTPATIENT
Start: 2022-11-28 | End: 2022-11-30 | Stop reason: HOSPADM

## 2022-11-28 RX ORDER — DOCUSATE SODIUM 100 MG/1
100 CAPSULE, LIQUID FILLED ORAL 2 TIMES DAILY
Status: DISCONTINUED | OUTPATIENT
Start: 2022-11-28 | End: 2022-11-30 | Stop reason: HOSPADM

## 2022-11-28 RX ADMIN — LIDOCAINE HYDROCHLORIDE 4 MG: 20 INJECTION, SOLUTION INFILTRATION; PERINEURAL at 08:49

## 2022-11-28 RX ADMIN — Medication 2.5 MILLION UNITS: at 07:54

## 2022-11-28 RX ADMIN — LIDOCAINE HYDROCHLORIDE AND EPINEPHRINE 3 ML: 15; 5 INJECTION, SOLUTION EPIDURAL at 07:21

## 2022-11-28 RX ADMIN — DOCUSATE SODIUM 100 MG: 100 CAPSULE, LIQUID FILLED ORAL at 15:24

## 2022-11-28 RX ADMIN — SODIUM CHLORIDE, PRESERVATIVE FREE 10 ML: 5 INJECTION INTRAVENOUS at 19:42

## 2022-11-28 RX ADMIN — BUPIVACAINE HYDROCHLORIDE 10 ML: 2.5 INJECTION, SOLUTION EPIDURAL; INFILTRATION; INTRACAUDAL at 07:26

## 2022-11-28 RX ADMIN — Medication 87.3 MILLI-UNITS/MIN: at 11:02

## 2022-11-28 RX ADMIN — Medication 1 MILLI-UNITS/MIN: at 05:31

## 2022-11-28 RX ADMIN — SODIUM CHLORIDE, POTASSIUM CHLORIDE, SODIUM LACTATE AND CALCIUM CHLORIDE: 600; 310; 30; 20 INJECTION, SOLUTION INTRAVENOUS at 00:00

## 2022-11-28 RX ADMIN — LIDOCAINE HYDROCHLORIDE 3 ML: 10 INJECTION, SOLUTION INFILTRATION; PERINEURAL at 07:11

## 2022-11-28 RX ADMIN — IBUPROFEN 800 MG: 800 TABLET, FILM COATED ORAL at 15:24

## 2022-11-28 RX ADMIN — Medication 12 ML/HR: at 08:00

## 2022-11-28 RX ADMIN — SODIUM CHLORIDE, POTASSIUM CHLORIDE, SODIUM LACTATE AND CALCIUM CHLORIDE 500 ML: 600; 310; 30; 20 INJECTION, SOLUTION INTRAVENOUS at 07:09

## 2022-11-28 RX ADMIN — DEXTROSE MONOHYDRATE 5 MILLION UNITS: 5 INJECTION INTRAVENOUS at 00:00

## 2022-11-28 RX ADMIN — Medication 166.7 ML: at 10:31

## 2022-11-28 RX ADMIN — BENZOCAINE AND LEVOMENTHOL: 200; 5 SPRAY TOPICAL at 15:24

## 2022-11-28 RX ADMIN — OXYCODONE HYDROCHLORIDE 10 MG: 10 TABLET ORAL at 19:41

## 2022-11-28 RX ADMIN — Medication 2.5 MILLION UNITS: at 03:53

## 2022-11-28 RX ADMIN — DOCUSATE SODIUM 100 MG: 100 CAPSULE, LIQUID FILLED ORAL at 19:42

## 2022-11-28 ASSESSMENT — PAIN DESCRIPTION - DESCRIPTORS
DESCRIPTORS: PRESSURE
DESCRIPTORS: ACHING
DESCRIPTORS: ACHING
DESCRIPTORS: PRESSURE
DESCRIPTORS: ACHING
DESCRIPTORS: PRESSURE
DESCRIPTORS: PRESSURE

## 2022-11-28 ASSESSMENT — PAIN DESCRIPTION - FREQUENCY
FREQUENCY: INTERMITTENT

## 2022-11-28 ASSESSMENT — PAIN DESCRIPTION - LOCATION
LOCATION: ABDOMEN

## 2022-11-28 ASSESSMENT — PAIN - FUNCTIONAL ASSESSMENT
PAIN_FUNCTIONAL_ASSESSMENT: PREVENTS OR INTERFERES SOME ACTIVE ACTIVITIES AND ADLS
PAIN_FUNCTIONAL_ASSESSMENT: ACTIVITIES ARE NOT PREVENTED

## 2022-11-28 ASSESSMENT — PAIN DESCRIPTION - ORIENTATION
ORIENTATION: LOWER

## 2022-11-28 ASSESSMENT — PAIN DESCRIPTION - PAIN TYPE
TYPE: ACUTE PAIN

## 2022-11-28 ASSESSMENT — PAIN SCALES - GENERAL
PAINLEVEL_OUTOF10: 2
PAINLEVEL_OUTOF10: 0
PAINLEVEL_OUTOF10: 1
PAINLEVEL_OUTOF10: 7

## 2022-11-28 ASSESSMENT — PAIN DESCRIPTION - ONSET
ONSET: GRADUAL
ONSET: ON-GOING

## 2022-11-28 NOTE — ANESTHESIA POSTPROCEDURE EVALUATION
Department of Anesthesiology  Postprocedure Note    Patient: Marjan Valverde  MRN: [id number]  YOB: 2000  Date of evaluation: 11/28/2022      Procedure Summary     Date: 11/28/22 Room / Location:     Anesthesia Start: 6205 Anesthesia Stop: 5975    Procedure: Labor Analgesia Diagnosis:     Scheduled Providers:  Responsible Provider: Sparkle Lawrence MD    Anesthesia Type: epidural ASA Status: 1          Anesthesia Type: No value filed.     Didier Phase I: Didier Score: 9    Didier Phase II: Didier Score: 9      Anesthesia Post Evaluation    Patient location during evaluation: floor  Patient participation: complete - patient participated  Level of consciousness: awake and alert  Pain score: 0  Nausea & Vomiting: no nausea and no vomiting  Complications: no  Cardiovascular status: hemodynamically stable  Respiratory status: acceptable  Hydration status: stable

## 2022-11-28 NOTE — PLAN OF CARE
Problem: Vaginal Birth or  Section  Goal: Fetal and maternal status remain reassuring during the birth process  Description:  Birth OB-Pregnancy care plan goal which identifies if the fetal and maternal status remain reassuring during the birth process  Outcome: Progressing     Problem: Pain  Goal: Verbalizes/displays adequate comfort level or baseline comfort level  Outcome: Progressing     Problem: Infection - Adult  Goal: Absence of infection at discharge  Outcome: Progressing     Problem: Safety - Adult  Goal: Free from fall injury  Outcome: Progressing

## 2022-11-28 NOTE — FLOWSHEET NOTE
Social work order modified. Copied below. Patient is in need of a crib and car seat. late prenatal care noted. patient reports she lives in an apartment when asked.

## 2022-11-28 NOTE — FLOWSHEET NOTE
Recovery care completed at [phone] [id number]. Patient still numb. She will call out when she is ready to transfer via wheelchair to new room.

## 2022-11-28 NOTE — ANESTHESIA PROCEDURE NOTES
Epidural Block    Patient location during procedure: OB  Start time: 11/28/2022 7:09 AM  End time: 11/28/2022 7:21 AM  Reason for block: labor epidural  Staffing  Resident/CRNA: Elif Sotomayor [facility], APRN - CRNA  Epidural  Patient position: sitting  Prep: ChloraPrep  Patient monitoring: cardiac monitor, continuous pulse ox and frequent blood pressure checks  Approach: midline  Location: L3-4  Injection technique: DARRIAN air  Guidance: paresthesia technique  Provider prep: mask and sterile gloves  Needle  Needle type: Tuohy   Needle gauge: 17 G  Needle length: 3.5 in  Needle insertion depth: 5 cm  Catheter type: side hole  Catheter size: 19 G  Catheter at skin depth: 11 cm  Test dose: negativeCatheter Secured: tegaderm and tape  Assessment  Sensory level: T8  Hemodynamics: stable  Attempts: 1  Outcomes: uncomplicated and patient tolerated procedure well  Preanesthetic Checklist  Completed: patient identified, IV checked, site marked, risks and benefits discussed, surgical/procedural consents, equipment checked, pre-op evaluation, timeout performed, anesthesia consent given, oxygen available, monitors applied/VS acknowledged, fire risk safety assessment completed and verbalized and blood product R/B/A discussed and consented

## 2022-11-28 NOTE — FLOWSHEET NOTE
11/27/22 2015   Fetal Heart Rate   Mode External US   Baseline Rate 135 bpm   Baseline Classification Normal   Variability 6-25 BPM   Pattern Accelerations   Patient Feels Fetal Movement Yes   OB Bladder Status Non-distended   OB Bladder Intervention Voiding with Relief   Multiple birth? No   Fetal Monitoring Strip   FMS Reviewed? Yes   FMS Reviewed By? SD   Uterine Activity   UA Mode Palpation; Rondo   Contraction Frequency Irritability   Contraction Intensity N/A   Resting Tone Palpated Soft

## 2022-11-28 NOTE — LACTATION NOTE
This note was copied from a baby's chart. Lactation Progress Note  Initial Consult    Data: Referral received per MD.     Action: LC to room per MD request, MOB was 1.5 hour postpartum, MOB was having difficulty latching baby. Upon entering the room MOB was skin to skin with baby, mother states agreeable to consult from [facility] at this time. Attempted to help mob and infant latch. The circumference of MOB's nipple is very large, and since baby is 5lb 13oz, he was having a hard time taking the entire nipple into his mouth and only attempting to suck on the tip. First couple of attempts to latch were very painful for MOB, she rated them a 8/10. Dicussed and educated mob on the nipple shield has an intervention due to his small mouth and her large nipple. Educated on the risk of not transferring through shield and the important of pumping to help add additional stimulation. Mother agrees to intervention. Sized MOB for a M shield. Initially baby had difficulty latching and the dyad just needs practice but after nursing on her left side in cross cradle and football for about 8 mins, there was visible milk in the shield. MOB switched to other breast. Advised to continue with this plan. [address] [facility] set up pump in patient postpartum room. Advised to continue skin to skin with baby and that [facility] would provided further education and resources once mother is settled on postpartum. Placed fax for pump to [place]. Response: Mother verbalizes understanding of information given and denies further needs at this time. Mother states will call for next feeding.

## 2022-11-28 NOTE — H&P
Department of Obstetrics and Gynecology   Obstetrics History and Physical        CHIEF COMPLAINT:  induction of labor    HISTORY OF PRESENT ILLNESS:    Gisella Bridges  is a 23 y.o.  female at 36w3d presents with a chief complaint as above and is being admitted for IOL for advanced cervical dilation. Estimated Due Date: Estimated Date of Delivery: 22    [facility]: [facility]    PAST OB HISTORY:  OB History          2    Para   1    Term   1            AB        Living   1         SAB        IAB        Ectopic        Molar        Multiple        Live Births   1              Past Medical History:        Diagnosis Date    Anemia     Postpartum depression      Past Surgical History:    History reviewed. No pertinent surgical history. Allergies:  Patient has no known allergies.   Social History:    Social History     Socioeconomic History    Marital status: Single     Spouse name: Not on file    Number of children: Not on file    Years of education: Not on file    Highest education level: Not on file   Occupational History    Not on file   Tobacco Use    Smoking status: Former     Types: Cigars    Smokeless tobacco: Never   Vaping Use    Vaping Use: Never used   Substance and Sexual Activity    Alcohol use: Not Currently    Drug use: Never    Sexual activity: Yes     Partners: Male   Other Topics Concern    Not on file   Social History Narrative    Not on file     Social Determinants of Health     Financial Resource Strain: Not on file   Food Insecurity: Not on file   Transportation Needs: Not on file   Physical Activity: Not on file   Stress: Not on file   Social Connections: Not on file   Intimate Partner Violence: Not on file   Housing Stability: Not on file     Family History:       Problem Relation Age of Onset    Kidney Disease Mother     Heart Disease Mother     High Blood Pressure Mother     Diabetes Father      Medications Prior to Admission:  Medications Prior to Admission: Prenatal Vit-Fe Fumarate-FA (PRENATAL VITAMINS) 28-0.8 MG TABS, Take 1 tablet by mouth daily  ondansetron (ZOFRAN ODT) 4 MG disintegrating tablet, Take 1 tablet by mouth every 12 hours as needed for Nausea (Patient not taking: No sig reported)    REVIEW OF SYSTEMS:  negative     PHYSICAL EXAM:    Vitals:    11/28/22 0528 11/28/22 0558 11/28/22 0628 11/28/22 0658   BP: 130/82 115/77 136/77 110/67   Pulse: 76 71 62 78   Resp: 18  18 18   Temp:       TempSrc:       SpO2:       Weight:       Height:         General appearance:  awake, alert, cooperative, no apparent distress, and appears stated age  Neurologic:  Awake, alert, oriented to name, place and time. Lungs:  No increased work of breathing, good air exchange  Abdomen:  Soft, non tender, gravid, fundal height consistent with the gestational age, EFW by Leopold's maneuvers is AGA  Pelvis:  Adequate pelvis  Cervix: 6cm on admission  Contraction frequency: irregular  Membranes:  intact  Labs: CBC:   Lab Results   Component Value Date/Time    WBC 9.4 11/27/2022 07:56 PM    RBC 4.22 11/27/2022 07:56 PM    HGB 9.7 11/27/2022 07:56 PM    HCT 31.4 11/27/2022 07:56 PM    MCV 74.4 11/27/2022 07:56 PM    MCH 23.0 11/27/2022 07:56 PM    MCHC 31.0 11/27/2022 07:56 PM    RDW 17.1 11/27/2022 07:56 PM     11/27/2022 07:56 PM    MPV 10.0 11/27/2022 07:56 PM       ASSESSMENT:  Normal labor and delivery    PLAN: Admit  Labor: Routine labor orders  Fetus: Reassuring  GBS: Positive    I have presented reasonable alternatives to the patient's proposed care, treatment, and services. The discussion I have done encompassed risks, benefits, and side effects related to the alternatives and the risks related to not receiving the proposed care, treatment, and services. All questions answered. Patient wishes to proceed. The surgical site was confirmed by the patient and me.       Electronically signed by Gurjit Smith MD on 11/28/2022 at 8:16 AM

## 2022-11-28 NOTE — FLOWSHEET NOTE
Patient is in need of a crib and car seat-social work consult placed in  chart and was already placed in mother's chart.

## 2022-11-28 NOTE — FLOWSHEET NOTE
Received call back from office. Office is looking for results of gbs and have placed a call to the doctors to see about the results. In labor, patient was treated with antibiotics. Copied times below. penicillin G potassium 5 Million Units in dextrose 5 % 100 mL IVPB (mini-bag)  Dose: 5 Million Units  Freq: ONCE Route: IV  Start: 11/28/22 0000 End: 11/28/22 0035   Order specific questions:   Antimicrobial Indications GBS Prophylaxis       0000           Followed by  penicillin G potassium in d5w IVPB 2.5 Million Units  Dose: 2.5 Million Units  Freq: EVERY 4 HOURS Route: IV  Start: 11/28/22 0400 End: 11/28/22 1421   Admin Instructions:   Begin 4 hours after loading dose. Continue until delivery.    Order specific questions:   Antimicrobial Indications GBS Prophylaxis       [id number]

## 2022-11-28 NOTE — ANESTHESIA PRE PROCEDURE
Department of Anesthesiology  Preprocedure Note       Name:  Charles Lao   Age:  23 y.o.  :  2000                                          MRN:  [id number]         Date:  2022      Surgeon: * No surgeons listed *    Procedure: * No procedures listed *    Medications prior to admission:   Prior to Admission medications    Medication Sig Start Date End Date Taking?  Authorizing Provider   Prenatal Vit-Fe Fumarate-FA (PRENATAL VITAMINS) 28-0.8 MG TABS Take 1 tablet by mouth daily 6/10/22   Morris Gutierrez PA-C   ondansetron (ZOFRAN ODT) 4 MG disintegrating tablet Take 1 tablet by mouth every 12 hours as needed for Nausea  Patient not taking: No sig reported 6/10/22   Morris Gutierrez PA-C       Current medications:    Current Facility-Administered Medications   Medication Dose Route Frequency Provider Last Rate Last Admin    lactated ringers infusion   IntraVENous Continuous Neda Garcia MD        lactated ringers bolus  500 mL IntraVENous PRN Neda Garcia MD        Or    lactated ringers bolus  1,000 mL IntraVENous PRN Neda Garcia MD        sodium chloride flush 0.9 % injection 5-40 mL  5-40 mL IntraVENous 2 times per day Neda Garcia MD        sodium chloride flush 0.9 % injection 5-40 mL  5-40 mL IntraVENous PRN Neda Garcia MD        0.9 % sodium chloride infusion  25 mL IntraVENous PRN Neda Garcia MD        [facility] injection 4 mg  4 mg IntraVENous Q6H PRN Neda Garcia MD        methylergonovine (METHERGINE) injection 200 mcg  200 mcg IntraMUSCular PRN Neda Garcia MD        carboprost (HEMABATE) injection 250 mcg  250 mcg IntraMUSCular PRN Neda Garcia MD        miSOPROStol (CYTOTEC) tablet 800 mcg  800 mcg Rectal PRN Neda Garcia MD        tranexamic acid (CYKLOKAPRON) 1,000 mg in sodium chloride 0.9 % 60 mL IVPB  1,000 mg IntraVENous Once PRN Neda Garcia MD        acetaminophen (TYLENOL) tablet 650 mg  650 mg Oral Q4H PRN Valeria Young, MD        benzocaine-menthol (DERMOPLAST) 20-0.5 % spray   Topical PRN Valeria Young, MD Eduardo Waters ON 11/28/2022] oxytocin (PITOCIN) 30 units in 500 mL infusion  1-20 charlene-units/min IntraVENous Continuous Valeria Young MD        terbutaline (BRETHINE) injection 0.25 mg  0.25 mg SubCUTAneous Once PRN Valeria Young, MD        witch hazel-glycerin (TUCKS) pad   Topical PRN Valeria Never, MD       [facility] Nikki Waters ON 11/28/2022] penicillin G potassium 5 Million Units in dextrose 5 % 100 mL IVPB (mini-bag)  5 Million Units IntraVENous Once Valeria Young, MD        Followed by   Luci Pickering ON 11/28/2022] penicillin G potassium in d5w IVPB 2.5 Million Units  2.5 Million Units IntraVENous Q4H Valeria Young MD           Allergies:  No Known Allergies    Problem List:    Patient Active Problem List   Diagnosis Code    Normal labor and delivery O80       Past Medical History:        Diagnosis Date    Anemia     Postpartum depression        Past Surgical History:  History reviewed. No pertinent surgical history. Social History:    Social History     Tobacco Use    Smoking status: Former     Types: Cigars    Smokeless tobacco: Never   Substance Use Topics    Alcohol use: Not Currently                                Counseling given: Not Answered      Vital Signs (Current):   Vitals:    11/27/22 1938   Weight: 179 lb (81.2 kg)   Height: 5' 4\" (1.626 m)                                              BP Readings from Last 3 Encounters:   11/26/22 135/84   11/17/22 130/86   08/01/22 117/72       NPO Status:  clears during labor                                                                               BMI:   Wt Readings from Last 3 Encounters:   11/27/22 179 lb (81.2 kg)   11/26/22 178 lb (80.7 kg)   11/17/22 178 lb (80.7 kg)     Body mass index is 30.73 kg/m².     CBC:   Lab Results   Component Value Date/Time    WBC 7.5 06/10/2022 03:47 PM    RBC 4.45 06/10/2022 03:47 PM    HGB 12.7 06/10/2022 03:47 PM    HCT 37.9 06/10/2022 03:47 PM    MCV 85.2 06/10/2022 03:47 PM    RDW 14.4 06/10/2022 03:47 PM     06/10/2022 03:47 PM       CMP:   Lab Results   Component Value Date/Time     06/10/2022 03:47 PM    K 3.4 06/10/2022 03:47 PM     06/10/2022 03:47 PM    CO2 21 06/10/2022 03:47 PM    BUN 4 06/10/2022 03:47 PM    CREATININE <0.5 06/10/2022 03:47 PM    GFRAA >60 06/10/2022 03:47 PM    AGRATIO 1.4 06/10/2022 03:47 PM    LABGLOM >60 06/10/2022 03:47 PM    GLUCOSE 81 06/10/2022 03:47 PM    PROT 7.7 06/10/2022 03:47 PM    CALCIUM 9.7 06/10/2022 03:47 PM    BILITOT <0.2 06/10/2022 03:47 PM    ALKPHOS 70 06/10/2022 03:47 PM    AST 12 06/10/2022 03:47 PM    ALT 13 06/10/2022 03:47 PM       POC Tests: No results for input(s): POCGLU, POCNA, POCK, POCCL, POCBUN, POCHEMO, POCHCT in the last 72 hours.     Coags: No results found for: PROTIME, INR, APTT    HCG (If Applicable):   Lab Results   Component Value Date    PREGTESTUR POSITIVE 02/11/2021        ABGs: No results found for: PHART, PO2ART, NEO7BBW, EII7VOJ, BEART, G3VOLAUW     Type & Screen (If Applicable):  No results found for: LABABO, LABRH    Drug/Infectious Status (If Applicable):  No results found for: HIV, HEPCAB    COVID-19 Screening (If Applicable): No results found for: COVID19        Anesthesia Evaluation  Patient summary reviewed no history of anesthetic complications:   Airway: Mallampati: II  TM distance: >3 FB   Neck ROM: full  Mouth opening: > = 3 FB   Dental: normal exam         Pulmonary:Negative Pulmonary ROS and normal exam                               Cardiovascular:Negative CV ROS  Exercise tolerance: good (>4 METS),           Rhythm: regular  Rate: normal           Beta Blocker:  Not on Beta Blocker         Neuro/Psych:   Negative Neuro/Psych ROS              GI/Hepatic/Renal: Neg GI/Hepatic/Renal ROS            Endo/Other: Negative Endo/Other ROS                    Abdominal:             Vascular: negative vascular ROS. Other Findings:           Anesthesia Plan      epidural     ASA 1             Anesthetic plan and risks discussed with patient. Use of blood products discussed with patient whom consented to blood products.                      NAYAN Wilson - CRNA   11/27/2022

## 2022-11-28 NOTE — FLOWSHEET NOTE
Patient is listed as the scheduled 0300 induction. Patient did not have transportation and rides the bus and there isn't a bus that runs during her induction time, so patient arrived early. Patient did call and Dr. Merline Doyne informed and orders received and placed. See orders for details. Plan of care discussed with patient and understanding verbalized.

## 2022-11-28 NOTE — CARE COORDINATION
Case Management Mom/Baby Assessment  22 12:00pm  SW advised MOB that SW had contacted the Health Department and they will provide Pack and Play for . They will call MOB shortly. MOB was on phone with Health Department when SW entered the room- SW to return. 12:30pm  SW attempted to complete Assessment - patient's visitor refused to leave the room and felt that the questions asked were rude, and that patient didn't need to answer questions. SW placed her phone # on the board and asked MOB to call when she wanted to talk to SW. Identifying Information    Mother of Baby: Franco Ramirez Mother's : 2000    Father of Baby: not identified  Father's :     Baby's Name: Nany Lomas  Delivery Date:22  [de-identified] Weight: 5lb 4oz  Apgar 7/9  Nursing concerns for baby: None  Prenatal Care: [facility] Urine or Cord Drug Screen Yes___  No_x__ Results____  [facility] for baby: [facility]  Date of appt:         Time of Appt:    Reasoning for Referral:No Car Seat or Crib    Assessment Information    Discharge [address] [place] [address]  Phone: [phone]    Resides with:Unknown    Emergency Contact:Melida Luis( mother)Phone:[phone], she has 16 month old with her. Support System:Sister    Other Children: 16 month old    Custody:Unknown    Father of Baby Involvement:Unknown    Have you ever had contact with Children's Services (describe): Unknown    Supplies:  Car Seat- None- [facility] to provide  Diapers  Crib/Bassinet- None- health department to provide( SW called Health department and they will provide pack and play for , MOB to contact them back if someone can  or if she needs it delivered.   Feeding- Breast feeding  [place]    Resources:  [facility]  Medicaid  [employer] [place]  [employer] [facility] [employer] referral made  Transportation   Cash Assistance     Education:unknown  Occupation: unknown    History   Domestic Abuse:Unknown  Physical Abuse: Unknown  Sexual Abuse:Unknown  Drug Abuse/Prescription :unknown  Medication:Unknown  Depression:unknown  Medication/Counseling: Unknown    Summary:MOB has No Car Seat or Crib for     Referrals:Health Department for Crib/pack and play    Intervention: [facility]  [phone], left message. [facility] to [facility]. Mother just delivered at 10:26am - will see MOB again later today to complete assessment.

## 2022-11-28 NOTE — L&D DELIVERY NOTE
Department of Obstetrics and Gynecology  Induced Vaginal Delivery Note    Robert Cook [facility],[id number]    PRENATAL CARE: Complicated by: late [address] (initiated at 29 wks)    Pre-operative Diagnosis:  IUP @ 39 1/7 wks; advanced cervical dilation      Post-operative Diagnosis: the same    Type of induction: AROM and pitocin per protocol    Additional Procedures: 1st degree perineal laceration     Information for the patient's :  Fide Rose [[id number]]                  Infant Wt: delayed for kangaroo care  Information for the patient's :  Fide Rose [[id number]]          APGARS:     Information for the patient's :  Fide Rose [[id number]]         Anesthesia:  epidural anesthesia    Specimen:  Placenta sent to pathology No    Estimated blood loss: QBL 50mls     Condition: stable    Delivery Summary: Patient is Trilby Eisenmenger  is a 23 y.o.  female at 39w1d admitted to Labor and Delivery room for induction of labor and placed on external monitors. After FHT were confirmed to be reassuring the induction proceeded with pitocin by protocol. Contractions were regular, FHT reactive with moderate variability without decels. Pt did received epidural anesthesia. Progress of labor as anticipated and now fully dilated cervix. With subsequent contractions she started pushing and delivered vaginally spontaneously with no complications. 10 Units of Pitocin in 500 ml of LR was started IV. Placenta, cord and membranes were delivered spontaneously within less than 15 minutes. Uterus was explored. Vaginal walls, cervix, perineum, rectum were intact on inspection. The cervix, vagina and perineum were examined and a 1st degree right labial laceration was repaired in the regular fashion with Vicryl. Uterus was well contracted. Vaginal sweep was done, laps count was correct. Mother and  left stable to recovery.      Electronically signed by Magdalena Baron MD on 11/28/2022 at 10:59 AM

## 2022-11-28 NOTE — FLOWSHEET NOTE
Dr Masha Mendez at bedside to assess patient. 9 cm. Will plan to receive re dose and use peanut ball and empty bladder. Ok to increase pitocin at this time. Barrett He called and on way, after she is comfortable , I will empty bladder and use peanut ball.

## 2022-11-28 NOTE — PLAN OF CARE
Problem: Vaginal Birth or  Section  Goal: Fetal and maternal status remain reassuring during the birth process  Description:  Birth OB-Pregnancy care plan goal which identifies if the fetal and maternal status remain reassuring during the birth process  2022 by Waldo Portillo RN  Outcome: Completed  2022 by Emily Richter RN  Outcome: Progressing     Problem: Postpartum  Goal: Experiences normal postpartum course  Description:  Postpartum OB-Pregnancy care plan goal which identifies if the mother is experiencing a normal postpartum course  Outcome: Completed  Goal: Appropriate maternal -  bonding  Description:  Postpartum OB-Pregnancy care plan goal which identifies if the mother and  are bonding appropriately  Outcome: Completed  Goal: Establishment of infant feeding pattern  Description:  Postpartum OB-Pregnancy care plan goal which identifies if the mother is establishing a feeding pattern with their   Outcome: Completed  Goal: Incisions, wounds, or drain sites healing without S/S of infection  Outcome: Completed     Problem: Pain  Goal: Verbalizes/displays adequate comfort level or baseline comfort level  2022 by Waldo Portillo RN  Outcome: Completed  2022 by Emily Richter RN  Outcome: Progressing     Problem: Infection - Adult  Goal: Absence of infection at discharge  2022 by Waldo Portillo RN  Outcome: Completed  2022 by Emily Richter RN  Outcome: Progressing  Goal: Absence of infection during hospitalization  Outcome: Completed  Goal: Absence of fever/infection during anticipated neutropenic period  Outcome: Completed     Problem: Safety - Adult  Goal: Free from fall injury  2022 by Waldo Portillo RN  Outcome: Completed  2022 by Emily Richter RN  Outcome: Progressing     Problem: Discharge Planning  Goal: Discharge to home or other facility with appropriate resources  Outcome: Completed     Problem: Chronic Conditions and Co-morbidities  Goal: Patient's chronic conditions and co-morbidity symptoms are monitored and maintained or improved  Outcome: Completed

## 2022-11-29 PROCEDURE — 6370000000 HC RX 637 (ALT 250 FOR IP): Performed by: OBSTETRICS & GYNECOLOGY

## 2022-11-29 PROCEDURE — 1220000000 HC SEMI PRIVATE OB R&B

## 2022-11-29 RX ADMIN — IBUPROFEN 800 MG: 800 TABLET, FILM COATED ORAL at 16:22

## 2022-11-29 RX ADMIN — IBUPROFEN 800 MG: 800 TABLET, FILM COATED ORAL at 08:11

## 2022-11-29 RX ADMIN — OXYCODONE HYDROCHLORIDE 10 MG: 10 TABLET ORAL at 06:31

## 2022-11-29 RX ADMIN — OXYCODONE HYDROCHLORIDE 10 MG: 10 TABLET ORAL at 19:47

## 2022-11-29 RX ADMIN — DOCUSATE SODIUM 100 MG: 100 CAPSULE, LIQUID FILLED ORAL at 08:11

## 2022-11-29 RX ADMIN — OXYCODONE 5 MG: 5 TABLET ORAL at 13:23

## 2022-11-29 RX ADMIN — DOCUSATE SODIUM 100 MG: 100 CAPSULE, LIQUID FILLED ORAL at 20:38

## 2022-11-29 RX ADMIN — ACETAMINOPHEN 1000 MG: 500 TABLET ORAL at 04:08

## 2022-11-29 RX ADMIN — IBUPROFEN 800 MG: 800 TABLET, FILM COATED ORAL at 00:45

## 2022-11-29 ASSESSMENT — PAIN DESCRIPTION - ORIENTATION
ORIENTATION: LOWER
ORIENTATION: LOWER;MID
ORIENTATION: LOWER;MID
ORIENTATION: LOWER
ORIENTATION: LOWER;MID
ORIENTATION: LOWER;MID
ORIENTATION: LOWER
ORIENTATION: LOWER
ORIENTATION: LOWER;MID
ORIENTATION: LOWER

## 2022-11-29 ASSESSMENT — PAIN DESCRIPTION - DESCRIPTORS
DESCRIPTORS: ACHING
DESCRIPTORS: CRAMPING;DISCOMFORT
DESCRIPTORS: CRAMPING;DISCOMFORT
DESCRIPTORS: DISCOMFORT
DESCRIPTORS: ACHING
DESCRIPTORS: CRAMPING;DISCOMFORT
DESCRIPTORS: ACHING
DESCRIPTORS: CRAMPING;DISCOMFORT
DESCRIPTORS: ACHING;DISCOMFORT

## 2022-11-29 ASSESSMENT — PAIN DESCRIPTION - ONSET
ONSET: ON-GOING

## 2022-11-29 ASSESSMENT — PAIN DESCRIPTION - PAIN TYPE
TYPE: ACUTE PAIN

## 2022-11-29 ASSESSMENT — PAIN - FUNCTIONAL ASSESSMENT
PAIN_FUNCTIONAL_ASSESSMENT: ACTIVITIES ARE NOT PREVENTED

## 2022-11-29 ASSESSMENT — PAIN SCALES - GENERAL
PAINLEVEL_OUTOF10: 7
PAINLEVEL_OUTOF10: 0
PAINLEVEL_OUTOF10: 7
PAINLEVEL_OUTOF10: 4
PAINLEVEL_OUTOF10: 0
PAINLEVEL_OUTOF10: 8
PAINLEVEL_OUTOF10: 6
PAINLEVEL_OUTOF10: 9
PAINLEVEL_OUTOF10: 7
PAINLEVEL_OUTOF10: 4

## 2022-11-29 ASSESSMENT — PAIN DESCRIPTION - LOCATION
LOCATION: BACK
LOCATION: BACK
LOCATION: ABDOMEN
LOCATION: BACK
LOCATION: ABDOMEN
LOCATION: BACK
LOCATION: ABDOMEN
LOCATION: BACK
LOCATION: ABDOMEN
LOCATION: BACK

## 2022-11-29 ASSESSMENT — PAIN DESCRIPTION - FREQUENCY
FREQUENCY: CONTINUOUS
FREQUENCY: INTERMITTENT
FREQUENCY: CONTINUOUS
FREQUENCY: INTERMITTENT
FREQUENCY: CONTINUOUS

## 2022-11-29 NOTE — PLAN OF CARE
Problem: Postpartum  Goal: Experiences normal postpartum course  Description:  Postpartum OB-Pregnancy care plan goal which identifies if the mother is experiencing a normal postpartum course  Outcome: Progressing  Goal: Appropriate maternal -  bonding  Description:  Postpartum OB-Pregnancy care plan goal which identifies if the mother and  are bonding appropriately  Outcome: Progressing  Goal: Establishment of infant feeding pattern  Description:  Postpartum OB-Pregnancy care plan goal which identifies if the mother is establishing a feeding pattern with their   Outcome: Progressing  Goal: Incisions, wounds, or drain sites healing without S/S of infection  Outcome: Progressing  Flowsheets  Taken 2022 1600  Incisions, Wounds, or Drain Sites Healing Without Sign and Symptoms of Infection: TWICE DAILY: Assess and document skin integrity  Taken 2022 0811  Incisions, Wounds, or Drain Sites Healing Without Sign and Symptoms of Infection: TWICE DAILY: Assess and document skin integrity     Problem: Pain  Goal: Verbalizes/displays adequate comfort level or baseline comfort level  Outcome: Progressing  Flowsheets  Taken 2022 1600  Verbalizes/displays adequate comfort level or baseline comfort level:   Encourage patient to monitor pain and request assistance   Assess pain using appropriate pain scale   Administer analgesics based on type and severity of pain and evaluate response   Implement non-pharmacological measures as appropriate and evaluate response   Consider cultural and social influences on pain and pain management   Notify Licensed Independent Practitioner if interventions unsuccessful or patient reports new pain  Taken 2022 0811  Verbalizes/displays adequate comfort level or baseline comfort level:   Encourage patient to monitor pain and request assistance   Assess pain using appropriate pain scale   Administer analgesics based on type and severity of pain and evaluate response   Implement non-pharmacological measures as appropriate and evaluate response   Consider cultural and social influences on pain and pain management   Notify Licensed Independent Practitioner if interventions unsuccessful or patient reports new pain     Problem: Infection - Adult  Goal: Absence of infection at discharge  Outcome: Progressing  Goal: Absence of infection during hospitalization  Outcome: Progressing  Goal: Absence of fever/infection during anticipated neutropenic period  Outcome: Progressing     Problem: Safety - Adult  Goal: Free from fall injury  Outcome: Progressing     Problem: Discharge Planning  Goal: Discharge to home or other facility with appropriate resources  Outcome: Progressing

## 2022-11-29 NOTE — PROGRESS NOTES
Department of Obstetrics and Gynecology  Vaginal Delivery Postpartum Rounds    SUBJECTIVE:  Pain is controlled with non-steroidal anti-inflammatory drugs. Her lochia is normal.    OBJECTIVE:  Vital Signs: /72   Pulse 85   Temp 98.3 °F (36.8 °C) (Oral)   Resp 17   Ht 5' 4\" (1.626 m)   Wt 179 lb (81.2 kg)   LMP  (LMP Unknown)   SpO2 99%   Breastfeeding Unknown   BMI 30.73 kg/m²   Appearance/Psychiatric: awake, alert, cooperative, no apparent distress, appears stated age  Constitutional: The patient is well nourished. Cardiovascular: She does not have edema.   Respiratory: Respiratory effort is normal.  Gastrointestinal: Soft, non tender, uterine fundus is firm below umbilicus  Extremities: nontender to palpation  Perineum: intact     LABS / IMAGING:  CBC:   Lab Results   Component Value Date/Time    WBC 9.4 11/27/2022 07:56 PM    RBC 4.22 11/27/2022 07:56 PM    HGB 9.7 11/27/2022 07:56 PM    HCT 31.4 11/27/2022 07:56 PM    MCV 74.4 11/27/2022 07:56 PM    MCH 23.0 11/27/2022 07:56 PM    MCHC 31.0 11/27/2022 07:56 PM    RDW 17.1 11/27/2022 07:56 PM     11/27/2022 07:56 PM    MPV 10.0 11/27/2022 07:56 PM       Lab Results   Component Value Date/Time    WBC 9.4 11/27/2022 07:56 PM    RBC 4.22 11/27/2022 07:56 PM    HGB 9.7 11/27/2022 07:56 PM    HCT 31.4 11/27/2022 07:56 PM    MCV 74.4 11/27/2022 07:56 PM    MCH 23.0 11/27/2022 07:56 PM    MCHC 31.0 11/27/2022 07:56 PM    RDW 17.1 11/27/2022 07:56 PM     11/27/2022 07:56 PM    MPV 10.0 11/27/2022 07:56 PM     Lab Results   Component Value Date/Time     06/10/2022 03:47 PM    K 3.4 06/10/2022 03:47 PM     06/10/2022 03:47 PM    CO2 21 06/10/2022 03:47 PM    BUN 4 06/10/2022 03:47 PM    CREATININE <0.5 06/10/2022 03:47 PM    GLUCOSE 81 06/10/2022 03:47 PM    CALCIUM 9.7 06/10/2022 03:47 PM     No results found for: POCGLU  Lab Results   Component Value Date/Time    ALKPHOS 70 06/10/2022 03:47 PM    ALT 13 06/10/2022 03:47 PM    AST

## 2022-11-29 NOTE — DISCHARGE SUMMARY
Department of Obstetrics and Gynecology  Postpartum Discharge Summary      Admit Date: 2022    Admit Diagnosis: IOL at 44 0/7     Discharge Date:  22 Any delay in discharge from ordered D/C date due to  factors. Discharge Diagnoses: spontaneous vaginal delivery       Medication List        CONTINUE taking these medications      Prenatal Vitamins 28-0.8 MG Tabs  Take 1 tablet by mouth daily            STOP taking these medications      ondansetron 4 MG disintegrating tablet  Commonly known as: Zofran ODT              Service: Obstetrics    Consults: None    Significant Diagnostic Studies: none    Postpartum complications: none     Condition at Discharge: [facility] [place]: uncomplicated    Discharge Instructions: Activity: as tolerated    Diet: regular diet    Instructions: No intercourse and nothing in the vagina for 6 weeks. Do not drive while using pain medications.  Keep any wounds clean and dry    Discharge to: Home    Disposition / Follow up: Return to office in 6 weeks    Home Health Nurse visit within 24-48 h if qualifies     Data:  Apgars:  Information for the patient's :  Deysi Sterling [[id number]]   APGAR One: 7   Information for the patient's :  Deysi Sterling [[id number]]   APGAR Five: 9   Birth Weight:  Information for the patient's :  Deysi Sterling [[id number]]   Birth Weight: 5 lb 13.8 oz (2.66 kg)   Home with mother    Electronically signed by Corinne Grady MD on 2022 at 1:31 PM

## 2022-11-29 NOTE — LACTATION NOTE
This note was copied from a baby's chart. Lactation Progress Note  Initial Consult    Data: Referral received per RN. Action: LC to room. Mother resting in bed. Infant skin to skin with MOB, she reports she just fed baby. Infant  showing some hunger cues at this time. Mother states agreeable to consult from [facility] at this time. Mob reports that her night was well. She has been using the shield. She felt that the shield may be rubbing a little bit, advised to ensure he still has a deep latch even with the shield. Infant started to further cue, MOB asked if we could latch with out shield. Baby immediately latched to her nipple without the shield. She reported minimal pain that went away after a few seconds. He did come on and off a few times but may be just due to being a small baby and her nipple size, however when he was on the breast he had a vigorous suck and was able to maintain a 4-6 suck swallow pattern. MOB grew more and more comfortable with the feeing of baby latching and said it felt better than the shield. LC held baby and breast in tea cup position and demonstrated to MOB how to hold and push her breast tissue forward to help her keep enough tissue in the infants mouth. Demonstrated how to hold baby close in to ensure he doesn't slip off the breast. Reinforced importance of positioning infant nose to nipple, belly to belly, waiting for wide open mouth, and bringing baby onto breast to ensure a deep latch. Discussed importance of obtaining deep latch to ensure proper milk transfer, milk production and supply and maternal comfort. [facility] hand expressed MOB and she got several drops with one compression. Encourage MOB that she has signs that she is doing what she needs to establish a great supply. Encourage to keep pumping as she is using the shield.      [facility] recommends circumcision to be performed today since mob is going home tomorrow, that way she can get continues support with feeding through the sleepy period after the procedure. I reviewed Care Plan for First 24 Hours of Life already in patient binder. Discussed recognizing hunger cues and offering the breast when cues are shown. Encouraged breastfeeding on demand and attempting/offering at least every 3 hours. Informed infant may have one 5 hour stretch of sleep in a 24 hour period. Encouraged unlimited skin to skin contact with infant and reviewed benefits including better temperature, heart rate, respiration, blood pressure, and blood sugar regulation. Also increased bonding and milk supply associated with skin to skin contact. Discussed feeding positions, latch on techniques, signs of milk transfer, output goals and normal feeding/sleeping behaviors. I referred mother to binder for additional information about breastfeeding and skin to skin contact. I wrote my name and circled the phone number on patient's whiteboard, provided a lactation consultant business card, directed mother to [facility] [employer]. for evidence based information, and encouraged mother to call for a feeding. Pump approved by ME and delivered to MOB. Response: Mother verbalizes understanding of information given and denies further needs at this time. Mother states will call for next feeding.

## 2022-11-29 NOTE — FLOWSHEET NOTE
Mother pumped for thirty minutes after breast feeding- nothing noted in bottles. Mother encouraged that milk supply comes in on day three or four. Will start next feeding session around 1930.

## 2022-11-29 NOTE — ANESTHESIA POSTPROCEDURE EVALUATION
Department of Anesthesiology  Postprocedure Note    Patient: Juan Hernandez  MRN: [id number]  YOB: 2000  Date of evaluation: 11/29/2022      Procedure Summary     Date: 11/28/22 Room / Location:     Anesthesia Start: 7964 Anesthesia Stop: 5154    Procedure: Labor Analgesia Diagnosis:     Scheduled Providers:  Responsible Provider: Odella Cooks, MD    Anesthesia Type: epidural ASA Status: 1          Anesthesia Type: No value filed.     Didier Phase I: Didier Score: 9    Didier Phase II: Didier Score: 9      Anesthesia Post Evaluation    Patient location during evaluation: floor  Patient participation: complete - patient participated  Level of consciousness: awake and alert  Pain score: 2  Airway patency: patent  Nausea & Vomiting: no vomiting and no nausea  Complications: no  Cardiovascular status: hemodynamically stable  Respiratory status: room air, spontaneous ventilation and acceptable  Hydration status: stable

## 2022-11-30 VITALS
HEIGHT: 64 IN | HEART RATE: 90 BPM | OXYGEN SATURATION: 98 % | DIASTOLIC BLOOD PRESSURE: 77 MMHG | TEMPERATURE: 98 F | WEIGHT: 179 LBS | SYSTOLIC BLOOD PRESSURE: 115 MMHG | BODY MASS INDEX: 30.56 KG/M2 | RESPIRATION RATE: 16 BRPM

## 2022-11-30 PROCEDURE — 6370000000 HC RX 637 (ALT 250 FOR IP): Performed by: OBSTETRICS & GYNECOLOGY

## 2022-11-30 RX ADMIN — IBUPROFEN 800 MG: 800 TABLET, FILM COATED ORAL at 00:11

## 2022-11-30 RX ADMIN — OXYCODONE HYDROCHLORIDE 10 MG: 10 TABLET ORAL at 03:50

## 2022-11-30 RX ADMIN — IBUPROFEN 800 MG: 800 TABLET, FILM COATED ORAL at 08:12

## 2022-11-30 RX ADMIN — DOCUSATE SODIUM 100 MG: 100 CAPSULE, LIQUID FILLED ORAL at 08:13

## 2022-11-30 RX ADMIN — ACETAMINOPHEN 1000 MG: 500 TABLET ORAL at 12:03

## 2022-11-30 RX ADMIN — IBUPROFEN 800 MG: 800 TABLET, FILM COATED ORAL at 16:27

## 2022-11-30 ASSESSMENT — PAIN - FUNCTIONAL ASSESSMENT
PAIN_FUNCTIONAL_ASSESSMENT: ACTIVITIES ARE NOT PREVENTED

## 2022-11-30 ASSESSMENT — PAIN DESCRIPTION - LOCATION
LOCATION: BACK

## 2022-11-30 ASSESSMENT — PAIN DESCRIPTION - FREQUENCY
FREQUENCY: INTERMITTENT
FREQUENCY: INTERMITTENT
FREQUENCY: CONTINUOUS

## 2022-11-30 ASSESSMENT — PAIN DESCRIPTION - ORIENTATION
ORIENTATION: LOWER

## 2022-11-30 ASSESSMENT — PAIN SCALES - GENERAL
PAINLEVEL_OUTOF10: 7
PAINLEVEL_OUTOF10: 8
PAINLEVEL_OUTOF10: 7
PAINLEVEL_OUTOF10: 8
PAINLEVEL_OUTOF10: 5

## 2022-11-30 ASSESSMENT — PAIN SCALES - WONG BAKER: WONGBAKER_NUMERICALRESPONSE: 0

## 2022-11-30 ASSESSMENT — PAIN DESCRIPTION - PAIN TYPE
TYPE: ACUTE PAIN

## 2022-11-30 ASSESSMENT — PAIN DESCRIPTION - ONSET
ONSET: ON-GOING

## 2022-11-30 ASSESSMENT — PAIN DESCRIPTION - DESCRIPTORS
DESCRIPTORS: CRAMPING;DISCOMFORT
DESCRIPTORS: ACHING;DISCOMFORT
DESCRIPTORS: ACHING;DISCOMFORT
DESCRIPTORS: ACHING;PRESSURE
DESCRIPTORS: CRAMPING;DISCOMFORT

## 2022-11-30 NOTE — PLAN OF CARE
Problem: Postpartum  Goal: Experiences normal postpartum course  Description:  Postpartum OB-Pregnancy care plan goal which identifies if the mother is experiencing a normal postpartum course  2022 07 by Rainer Lee RN  Outcome: Progressing  2022 by Nereyda Tapia  Outcome: Progressing  Goal: Appropriate maternal -  bonding  Description:  Postpartum OB-Pregnancy care plan goal which identifies if the mother and  are bonding appropriately  2022 07 by Rainer Lee RN  Outcome: Progressing  2022 174 by Nereyda Tapia  Outcome: Progressing  Goal: Establishment of infant feeding pattern  Description:  Postpartum OB-Pregnancy care plan goal which identifies if the mother is establishing a feeding pattern with their   2022 by Rainer Lee RN  Outcome: Progressing  2022 by Nereyda Tapia  Outcome: Progressing  Goal: Incisions, wounds, or drain sites healing without S/S of infection  2022 by Rainer Lee RN  Outcome: Progressing  2022 by Nereyda Tapia  Outcome: Progressing  Flowsheets  Taken 2022 1600  Incisions, Wounds, or Drain Sites Healing Without Sign and Symptoms of Infection: TWICE DAILY: Assess and document skin integrity  Taken 2022 0811  Incisions, Wounds, or Drain Sites Healing Without Sign and Symptoms of Infection: TWICE DAILY: Assess and document skin integrity     Problem: Pain  Goal: Verbalizes/displays adequate comfort level or baseline comfort level  2022 by Rainer Lee RN  Outcome: Progressing  2022 by Nereyda Tapia  Outcome: Progressing  Flowsheets  Taken 2022 1600  Verbalizes/displays adequate comfort level or baseline comfort level:   Encourage patient to monitor pain and request assistance   Assess pain using appropriate pain scale   Administer analgesics based on type and severity of pain and evaluate response   Implement non-pharmacological measures as appropriate and evaluate response   Consider cultural and social influences on pain and pain management   Notify Licensed Independent Practitioner if interventions unsuccessful or patient reports new pain  Taken 11/29/2022 0811  Verbalizes/displays adequate comfort level or baseline comfort level:   Encourage patient to monitor pain and request assistance   Assess pain using appropriate pain scale   Administer analgesics based on type and severity of pain and evaluate response   Implement non-pharmacological measures as appropriate and evaluate response   Consider cultural and social influences on pain and pain management   Notify Licensed Independent Practitioner if interventions unsuccessful or patient reports new pain     Problem: Infection - Adult  Goal: Absence of infection at discharge  11/30/2022 0724 by Gerhardt People, RN  Outcome: Progressing  11/29/2022 1740 by Rekha Fuentes  Outcome: Progressing  Goal: Absence of infection during hospitalization  11/30/2022 0724 by Gerhardt People, RN  Outcome: Progressing  11/29/2022 1740 by Rekha Fuentes  Outcome: Progressing  Goal: Absence of fever/infection during anticipated neutropenic period  11/30/2022 0724 by Gerhardt People, RN  Outcome: Progressing  11/29/2022 1740 by Rekha Fuentes  Outcome: Progressing     Problem: Safety - Adult  Goal: Free from fall injury  11/30/2022 0724 by Gerhardt People, RN  Outcome: Progressing  11/29/2022 1740 by Rekha Fuentes  Outcome: Progressing     Problem: Discharge Planning  Goal: Discharge to home or other facility with appropriate resources  11/30/2022 0724 by Gerhardt People, RN  Outcome: Progressing  11/29/2022 1740 by Rekha Fuentes  Outcome: Progressing

## 2022-11-30 NOTE — FLOWSHEET NOTE
RN assessment completed, see flowsheets. Denies headache, blurry vision, SOB, and chest pain. Uterus firm, U/-2 and vaginal bleeding WNL. Pt aware how to order meals, tolerating regular diet, denies nausea and vomiting. Pt reports flatus and emptying bladder well. Pt ambulating well without dizziness. All shower supplies either brought by pt or given. Water refreshed. Plan of care for the day reviewed with patient and no further questions at this time. Whiteboard updated and call light in reach.

## 2022-11-30 NOTE — DISCHARGE INSTRUCTIONS
Thank you for the opportunity to care for you and your family. We hope we always exceeded your expectations and provided very good care during your stay in the [facility]. We want to ensure that you have the help you need when you leave the hospital. If there is anything we can assist you with please let us know. Please call and schedule an appointment to be seen by your obstetrician as scheduled. You will need to call and make your own appointment. For breastfeeding moms, you can contact our lactation consultants with any problems or questions. Please call [phone] for questions. Diet  Eat a well balanced diet focusing on foods high in fiber and protein. Drink plenty of fluids, especially water. To avoid constipation you may take a mild stool softener as recommended by your doctor or midwife. Activity  Gradually increase your activity. Resume exercise only after advise by your doctor or midwife. Avoid lifting anything heavier than your baby for 2 weeks. Avoid driving for 5-7 days or when not taking narcotics. Rise slowly from a lying to sitting and then a standing position. Climb stairs one at a time. Use caution when carrying your baby up and down the stairs. NO SEXUAL activity for 6 weeks or until advised by your doctor. Nothing in the vagina. No tampons, no douches and no intercourse. Be prepared to discuss family planning at your follow-up OB visit. You may feel tired or have a lack of energy. You may continue your prenatal vitamin to replenish nutrients post delivery. Nap when your baby naps to catch up on sleep. EMOTIONS  You may feel roberts, sad, teary and/or overwhelmed. Contact your OB provider if you feel you may be showing signs of postpartum depression or have thoughts of harming yourself or your baby. If infant will not stop crying, contact another adult for help. Place the infant in his/her crib and step away for a break.   NEVER shake your baby.      BLEEDING  Vaginal bleeding will decrease in amount over the next few weeks. You will notice that as your activity increases, you flow may increase. This is your body's way of telling you to \"take it easy\" and rest.  Call your OB if you are saturating more than one maxi pad in an hour for 2 hours and resting does not help. Also call if your bleeding has a foul odor or you are passing clots larger than a lemon on several occasions. BREAST CARE  Take pain medications as needed and as prescribed by your [facility] provider for pain. If you develop a warm, red, tender area on your breast or develop a fever contact your OB provider. For breastfeeding mothers: If you become engorged, feeding may be more difficult or painful for 1-2 days. You may find it helpful to hand express some milk. Hand expressing may make it easier for your baby to latch. While breastfeeding, continue to take your prenatal vitamins as directed by your doctor or midwife. Refer to the breastfeeding booklet in the  folder/binder for more information. For any questions or concerns please contact a Lactation Consultant. Leave a message and your call will be returned. For NON-breastfeeding mothers: You may apply ice packs to your breasts over your bra for twenty minutes at a time for comfort. Avoid stimulation to your breast.  When showering allow the water to strike your back not your breasts. Wear a good fitting bra until your milk has come and gone. A sports bra is a good idea. INCISIONAL CARE/ EPISIOTOMY CARE  Incisional care:  Clean your incision in the shower with mild soap and water. After showering pat the incision area dry and allow the area to remain open to air. If used, steri-strips should be removed by 2 weeks. If used, staples should be removed by the home health nurse or OB office by 1 week. An abdominal binder may provide support for your incision. Episiotomy (robert-care):   Use the robert-bottle after toileting, until your bleeding stops. Cleanse your perineum from front to back. If used, stitches will dissolve in 4-6 weeks. You may use a sitz bath or soak in a clean tub with antibacterial soap as needed for comfort. Kegel exercises will help restore bladder control. SWELLING  Try to keep your legs elevated when you are sitting. When lying down, keep your legs elevated. When wearing stockings or socks, make sure they are not too tight. WHEN TO CALL THE DOCTOR  If you have a temperature of 100.6 or more. If your bleeding has increased and you are saturating a pad or more in 1 hour for 2 hours. Your abdomen is tender to the touch. You are passing blood clots bigger than the size of a lemon for several occasions. If you are experiencing extreme weakness or dizziness. If you are having flu-like symptoms: achy muscles or joints. If there is a foul-smell or green color to your vaginal bleeding. If you have pain that cannot be relieved. You have persistent burning with urination or frequency. Call if you have concerns about your well-being. You are unable to sleep, eat or are having thoughts of harming yourself or your baby. You have swelling, bleeding, drainage, foul odor, redness or warmth in/around your incision or stitches. You have a red, warm or tender area in your calf. Headache that rest and Tylenol does not relieve. Coronavirus ([id number]): Pregnancy, Birth and Baby Care    What do you need know if you are pregnant regarding coronavirus (COVID-19)? From what experts know so far, pregnant people do NOT seem more likely than others to get COVID-19 and do NOT have a higher risk of severe complications. What can you do to protect yourself from COVID-19? Practice social distancing.   When you need to leave the home try to stay at least 6 feet away from other people. Avoid large crowds.     When you leave the house to prevent spreading sickness to others - Wear a facemask covering your mouth and nose. Remove by folding outside of mask together and place in container, wash daily or as soiled. Wash your hands often by rubbing your hands with soap and water for at least 20 seconds, rinse, and dry with a paper towel that can be thrown away or may use hand  of at least 60% alcohol. Covering all surfaces of your hands by rubbing them together until dry. Avoid touching your face with unwashed hands, especially your mouth, nose and eyes. Avoid traveling. What should you do if you have or think you may have COVID-19? For most infected, this is a mild illness and you will be able to recover at home. To avoid spreading to others:  Stay home except to get medical care. Call a medical facility before you show up. This will help the staff prevent others from being exposed. Stay separate from others in your home, at least 6 feet. Use a separate bathroom, if possible. Wear a cloth facemask to cover your nose and mouth when you are around other people including at home. Clean your hands often. Wash your hands with soap and water for at least 20 seconds. Cough or sneeze into a tissue or your arm. Wash your hands immediately after. Dont share personal household items including towels and bedding. Clean and disinfect objects in your isolation area every day. If you are in labor and you have, or think you may have COVID-19, call your OB care provider and the hospital birthing unit before you go, so the staff can properly prepare and protect you, your baby and others from being infected. Wear a mask when you leave your home, as you will be asked to continue to wear a mask during your time in the hospital.  Mother-to-child transmission of COVID-19 during pregnancy is unlikely, but after birth a baby is susceptible to person-to-person spread.    The determination of whether or not to separate you and your baby at the time of birth will be decided using shared decision-making between you and your clinical team.  After your baby is born, your health care provider may recommend you not hold your baby and/or that you stay in a separate room from your baby until you get better. If you and your baby are not , wear a facemask at all times and wash your hands thoroughly before touching, holding or feeding your baby. Baby Care & Feeding   Breastfeeding has many benefits for you and your baby and is the best food for your baby. From what experts know so far, COVID-19 has not been found in breastmilk. Having a healthy adult who can assist with baby care until you get better is important, you may want to have a healthy adult feed your baby your expressed breast milk or formula if you chose to not breastfeed. You may use a breast pump to express your breast milk. Wear a face mask, wash your breasts, then wash your hands thoroughly before touching the breast pump and bottle parts. Clean all pump parts after each use. If you choose to breastfeed from your breast, wear a face mask, wash your breasts, wash your hands thoroughly before feeding your baby. Ways to [place] with Anxiety & Stress  It is normal to feel anxious or worried about COVID-19. You might feel sad about canceling celebrations and staying away from family and friends. Keep in mind that most people do not get severely ill from COVID-19. It is important to have a plan in case you get sick to prevent spreading the disease to others including an [address] (communicating and documenting your desired health care plan with family and healthcare team). You can take care of yourself by:  Taking a break from watching the news  Take deep breaths, stretch or meditate  Getting exercise, eating healthy foods, and drinking plenty of water  Finding activities you can enjoy inside your home  Staying in touch with your family and friends. Tell your partner, family, and friends how you are feeling.     When should you call for help? Call 911 anytime you think you may need emergency care. These post-birth warning signs can become life-threatening if you dont receive medical care right away:     Pain in chest, obstructed breathing or shortness of breath (trouble catching your breath) may mean you have a blood clot in your lung or a heart problem or COVID-19     Seizures may mean you have a condition called eclampsia     Thoughts or feelings of wanting to hurt yourself or someone else may mean you have postpartum depression    These could be signs that your COVID-19 symptoms are worsening and you may need emergency care:    ·         You are severely dizzy or lightheaded. ·         You are confused or can't think clearly. ·         Your face and lips have a blue color. ·         You are unable to respond to others or are very hard to wake up. Call your healthcare provider if you have: (If you cant reach your healthcare provider, call 911 or go to an emergency room)     Heavy Bleeding, soaking more than one pad in an hour or passing an egg-sized clot or bigger may mean you have an obstetric hemorrhage     Incision that is not healing, increased redness or any pus from episiotomy or  site may mean you have an infection     Redness, swelling, warmth, or pain in the calf area of your leg may mean you have a blood clot     Temperature of 100.4°F or higher, bad smelling vaginal blood or discharge may mean you have an infection      Headache (very painful), vision changes, or pain in the upper right area of your belly may mean you have high blood pressure or post birth preeclampsia    Call your provider before your next appointment if you develop any of the following symptoms:  ·     fever, cough, fatigue, anorexia, shortness of breath, sputum production, and muscle pains. Headache, confusion, rhinorrhea, sore throat, hemoptysis, vomiting, and diarrhea have been reported but are less common.  Some persons with COVID-19 have experienced gastrointestinal symptoms such as diarrhea and nausea prior to developing fever and lower respiratory tract signs and symptoms. Center for Disease Control and Prevention. Coronavirus Disease 2019 (COVID-19) Inpatient Obstetric Healthcare Guidance. Interim Considerations for Infection Prevention and Control of Coronavirus Disease 2019 (COVID-19) in [facility]. [employer]  Association of Womens Health, Obstetric, and  Nurses. [facility] Birth Warning Signs, 2018.

## 2022-12-01 NOTE — FLOWSHEET NOTE
Postpartum care instructions completed and forms signed by patient. Copy witnessed by RN and given to patient. Patient verbalized understanding of all teaching points and has no further questions. Patient plans to [facility] as instructed. Patient rooming in with infant and will remain on the unit after discharge.

## 2023-10-19 ENCOUNTER — HOSPITAL ENCOUNTER (EMERGENCY)
Age: 23
Discharge: HOME | End: 2023-10-20
Payer: COMMERCIAL

## 2023-10-19 VITALS
DIASTOLIC BLOOD PRESSURE: 75 MMHG | WEIGHT: 169.97 LBS | TEMPERATURE: 97.6 F | RESPIRATION RATE: 17 BRPM | HEART RATE: 85 BPM | SYSTOLIC BLOOD PRESSURE: 114 MMHG | BODY MASS INDEX: 29.18 KG/M2 | OXYGEN SATURATION: 100 %

## 2023-10-19 DIAGNOSIS — N93.8 DYSFUNCTIONAL UTERINE BLEEDING: Primary | ICD-10-CM

## 2023-10-19 LAB
BASOPHILS # BLD: 0.1 K/UL (ref 0–0.2)
BASOPHILS NFR BLD: 0.7 %
DEPRECATED RDW RBC AUTO: 15.1 % (ref 12.4–15.4)
EOSINOPHIL # BLD: 0.2 K/UL (ref 0–0.6)
EOSINOPHIL NFR BLD: 2.8 %
HCG UR QL: NEGATIVE
HCT VFR BLD AUTO: 41.8 % (ref 36–48)
HGB BLD-MCNC: 13.7 G/DL (ref 12–16)
INR PPP: 1.02 (ref 0.84–1.16)
LYMPHOCYTES # BLD: 2.9 K/UL (ref 1–5.1)
LYMPHOCYTES NFR BLD: 39.4 %
MCH RBC QN AUTO: 28 PG (ref 26–34)
MCHC RBC AUTO-ENTMCNC: 32.7 G/DL (ref 31–36)
MCV RBC AUTO: 85.6 FL (ref 80–100)
MONOCYTES # BLD: 0.5 K/UL (ref 0–1.3)
MONOCYTES NFR BLD: 6.3 %
NEUTROPHILS # BLD: 3.7 K/UL (ref 1.7–7.7)
NEUTROPHILS NFR BLD: 50.8 %
PLATELET # BLD AUTO: 220 K/UL (ref 135–450)
PMV BLD AUTO: 9.7 FL (ref 5–10.5)
PROTHROMBIN TIME: 13.4 SEC (ref 11.5–14.8)
RBC # BLD AUTO: 4.88 M/UL (ref 4–5.2)
WBC # BLD AUTO: 7.3 K/UL (ref 4–11)

## 2023-10-19 PROCEDURE — 85025 COMPLETE CBC W/AUTO DIFF WBC: CPT

## 2023-10-19 PROCEDURE — 99283 EMERGENCY DEPT VISIT LOW MDM: CPT

## 2023-10-19 PROCEDURE — 84703 CHORIONIC GONADOTROPIN ASSAY: CPT

## 2023-10-19 PROCEDURE — 85610 PROTHROMBIN TIME: CPT

## 2023-10-19 RX ORDER — NORGESTIMATE AND ETHINYL ESTRADIOL 7DAYSX3 LO
1 KIT ORAL DAILY
Qty: 1 PACKET | Refills: 1 | Status: SHIPPED | OUTPATIENT
Start: 2023-10-19

## 2023-10-19 ASSESSMENT — PAIN - FUNCTIONAL ASSESSMENT: PAIN_FUNCTIONAL_ASSESSMENT: NONE - DENIES PAIN

## 2023-10-20 ASSESSMENT — PAIN - FUNCTIONAL ASSESSMENT: PAIN_FUNCTIONAL_ASSESSMENT: NONE - DENIES PAIN

## 2023-10-20 NOTE — ED TRIAGE NOTES
Pt reports abnormal periods since having a vaginal delivery 10 months ago.  This period has lasted about two and a half weeks and is heavier than usual.

## 2023-10-20 NOTE — ED PROVIDER NOTES
[facility]  eMERGENCY dEPARTMENT eNCOUnter    Pt Name: @@  MRN: [id number]  Birthdate[birth year]/  Date of evaluation: 10/19/2023  Provider: NAYAN Bennett CNP      Independently seen and evaluated by the advanced practice provider  [address] [place] [address] Drive       Chief Complaint   Patient presents with    Vaginal Bleeding     Pt reports abnormal periods since having a vaginal delivery 10 months ago. This period has lasted about two and a half weeks and is heavier than usual.          HISTORY OF PRESENT ILLNESS  (Location/Symptom, Timing/Onset, Context/Setting, Quality, Duration, Modifying Factors, Severity.)   Den More is a 26 y.o. female who presents to the emergencydepartment PMHx postpartum depression and anemia    HPI provided by the patient  Patient presents to the emergency department reporting irregular vaginal bleeding for nearly 2 weeks. States its been irregular since she had her second child 10 months ago. Denies any abdominal pain dysuria. Denies lightheadedness or dizziness. Denies passing clots but changes her pad sometimes every hour. Has not followed up with her GYN from [facility].  Ab0  Sexually active without difficulty or dyspareunia      Nursing Notes were reviewed and I agree. REVIEW OF SYSTEMS    (2-9 systems for level 4, 10 or more for level 5)     Review of Systems   Genitourinary:  Positive for menstrual problem. Except as noted above the remainder of the review of systems was reviewed and negative. PAST MEDICAL HISTORY         Diagnosis Date    Anemia     Postpartum depression        SURGICAL HISTORY     History reviewed. No pertinent surgical history. CURRENT MEDICATIONS       Discharge Medication List as of 10/20/2023 12:03 AM          ALLERGIES     Patient has no known allergies.     FAMILY HISTORY           Problem Relation Age of Onset    Kidney Disease Mother     Heart Disease Mother     High Blood Pressure

## 2023-10-20 NOTE — ED NOTES
Pt discharged home in stable condition. Vitals stable and no iv in place. Discharge paperwork reviewed and verbally understood by patient at this time. Ambulatory.  Ok for [place], [address]  10/20/23 2889

## 2024-03-01 ENCOUNTER — APPOINTMENT (OUTPATIENT)
Dept: GENERAL RADIOLOGY | Age: 24
End: 2024-03-01
Payer: COMMERCIAL

## 2024-03-01 ENCOUNTER — HOSPITAL ENCOUNTER (EMERGENCY)
Age: 24
Discharge: HOME | End: 2024-03-01
Payer: COMMERCIAL

## 2024-03-01 VITALS
RESPIRATION RATE: 16 BRPM | OXYGEN SATURATION: 100 % | BODY MASS INDEX: 28.57 KG/M2 | DIASTOLIC BLOOD PRESSURE: 80 MMHG | HEART RATE: 86 BPM | TEMPERATURE: 98.8 F | SYSTOLIC BLOOD PRESSURE: 126 MMHG | WEIGHT: 167.33 LBS | HEIGHT: 64 IN

## 2024-03-01 DIAGNOSIS — M79.641 RIGHT HAND PAIN: Primary | ICD-10-CM

## 2024-03-01 PROCEDURE — 73130 X-RAY EXAM OF HAND: CPT

## 2024-03-01 PROCEDURE — 99283 EMERGENCY DEPT VISIT LOW MDM: CPT

## 2024-03-01 RX ORDER — NAPROXEN 500 MG/1
500 TABLET ORAL 2 TIMES DAILY PRN
Qty: 20 TABLET | Refills: 0 | Status: SHIPPED | OUTPATIENT
Start: 2024-03-01

## 2024-03-01 ASSESSMENT — PAIN DESCRIPTION - DESCRIPTORS
DESCRIPTORS: DISCOMFORT
DESCRIPTORS: ACHING

## 2024-03-01 ASSESSMENT — PAIN DESCRIPTION - ORIENTATION
ORIENTATION: RIGHT
ORIENTATION: RIGHT

## 2024-03-01 ASSESSMENT — PAIN DESCRIPTION - PAIN TYPE
TYPE: ACUTE PAIN
TYPE: ACUTE PAIN

## 2024-03-01 ASSESSMENT — PAIN - FUNCTIONAL ASSESSMENT
PAIN_FUNCTIONAL_ASSESSMENT: 0-10
PAIN_FUNCTIONAL_ASSESSMENT: ACTIVITIES ARE NOT PREVENTED

## 2024-03-01 ASSESSMENT — PAIN DESCRIPTION - LOCATION
LOCATION: HAND
LOCATION: HAND

## 2024-03-01 ASSESSMENT — PAIN SCALES - GENERAL
PAINLEVEL_OUTOF10: 8
PAINLEVEL_OUTOF10: 6

## 2024-03-01 NOTE — ED PROVIDER NOTES
[facility] EMERGENCY DEPARTMENT  EMERGENCY DEPARTMENT ENCOUNTER        Pt Name: Betty Barbosa  MRN: [id number]  Birthdate 2000  Date of evaluation: 3/1/2024  Provider: Abhilash Phelps PA-C  PCP: No primary care provider on file.  Note Started: 11:11 AM EST 3/1/24      AMARA. I have evaluated this patient.        CHIEF COMPLAINT       Chief Complaint   Patient presents with    Hand Injury     Right hand pain x 3 weeks. Patient reports she does a lot of cleaning and maybe injured her hand.        HISTORY OF PRESENT ILLNESS: 1 or more Elements     History From: patient    Betty Barbosa is a 23 y.o. female who presents complaining of right hand pain for 3 weeks.  Denies any trauma, thinks she may have aggravated it while cleaning.  Denies any swelling, wounds, fever.  Pain is on the palm and the back of the hand, worse with movement, relieved by rest.  She is not taking any medication for this.    Nursing Notes were all reviewed and agreed with or any disagreements were addressed in the HPI.    REVIEW OF SYSTEMS :      Review of Systems   Constitutional: Negative.    Musculoskeletal: Negative.    Skin: Negative.    Neurological: Negative.        Positives and Pertinent negatives as per HPI.       PAST MEDICAL HISTORY    has a past medical history of Anemia and Postpartum depression.     SURGICAL HISTORY   History reviewed. No pertinent surgical history.    CURRENTMEDICATIONS       Previous Medications    No medications on file       ALLERGIES     Patient has no known allergies.    FAMILYHISTORY       Family History   Problem Relation Age of Onset    Kidney Disease Mother     Heart Disease Mother     High Blood Pressure Mother     Diabetes Father         SOCIAL HISTORY       Social History     Tobacco Use    Smoking status: Every Day     Types: Cigars, Cigarettes    Smokeless tobacco: Never   Vaping Use    Vaping Use: Never used   Substance Use Topics    Alcohol use: Not

## 2024-03-01 NOTE — ED NOTES
Discussed discharge instructions with pt, verbalized understanding, all questions answered. No additional concerns at time of discharge.

## 2024-06-03 ENCOUNTER — APPOINTMENT (OUTPATIENT)
Dept: GENERAL RADIOLOGY | Age: 24
End: 2024-06-03
Payer: COMMERCIAL

## 2024-06-03 ENCOUNTER — HOSPITAL ENCOUNTER (EMERGENCY)
Age: 24
Discharge: HOME | End: 2024-06-03
Attending: EMERGENCY MEDICINE
Payer: COMMERCIAL

## 2024-06-03 VITALS
SYSTOLIC BLOOD PRESSURE: 124 MMHG | RESPIRATION RATE: 16 BRPM | OXYGEN SATURATION: 98 % | HEART RATE: 75 BPM | WEIGHT: 154.1 LBS | BODY MASS INDEX: 26.31 KG/M2 | HEIGHT: 64 IN | DIASTOLIC BLOOD PRESSURE: 83 MMHG | TEMPERATURE: 98.2 F

## 2024-06-03 DIAGNOSIS — S93.401A SPRAIN OF RIGHT ANKLE, UNSPECIFIED LIGAMENT, INITIAL ENCOUNTER: ICD-10-CM

## 2024-06-03 DIAGNOSIS — S92.354A NONDISPLACED FRACTURE OF FIFTH METATARSAL BONE, RIGHT FOOT, INITIAL ENCOUNTER FOR CLOSED FRACTURE: Primary | ICD-10-CM

## 2024-06-03 PROCEDURE — 73610 X-RAY EXAM OF ANKLE: CPT

## 2024-06-03 PROCEDURE — 99283 EMERGENCY DEPT VISIT LOW MDM: CPT

## 2024-06-03 PROCEDURE — 73630 X-RAY EXAM OF FOOT: CPT

## 2024-06-03 ASSESSMENT — LIFESTYLE VARIABLES
HOW MANY STANDARD DRINKS CONTAINING ALCOHOL DO YOU HAVE ON A TYPICAL DAY: PATIENT DOES NOT DRINK
HOW OFTEN DO YOU HAVE A DRINK CONTAINING ALCOHOL: NEVER

## 2024-06-03 ASSESSMENT — PAIN DESCRIPTION - ORIENTATION
ORIENTATION: RIGHT
ORIENTATION: RIGHT

## 2024-06-03 ASSESSMENT — PAIN DESCRIPTION - LOCATION
LOCATION: ANKLE;FOOT
LOCATION: ANKLE

## 2024-06-03 ASSESSMENT — PAIN SCALES - GENERAL
PAINLEVEL_OUTOF10: 5
PAINLEVEL_OUTOF10: 5

## 2024-06-03 NOTE — ED PROVIDER NOTES
nontender with forage motion.  All other extremities non-tender with full range of motion.  Radial and dorsalis pedis pulses were intact.  No calf tenderness erythema or edema.  Neurological: Alert and oriented x 3.  Speech clear. No facial droop.  No acute focal motor or sensory deficits.  Skin: Skin is warm and dry. No rash.   Psychiatric: Normal mood and affect. Behavior is normal.         DIAGNOSTIC RESULTS     EKG: All EKG's are interpreted by me, the Emergency Department Physician, who either signs or Co-signs this chart in the absence of a cardiologist.        RADIOLOGY:   Non-plain film images such as CT, Ultrasound and MRI are read by the radiologist. Plain radiographic images are visualized and preliminarily interpreted by the emergency physician with the below findings:        Interpretation per the Radiologist below, if available at the time of this note:    XR ANKLE RIGHT (MIN 3 VIEWS)   Final Result   Unremarkable foot and ankle.  No fracture or dislocation.      Recommend CT or MRI to further evaluate as appropriate.         XR FOOT RIGHT (MIN 3 VIEWS)   Final Result   Unremarkable foot and ankle.  No fracture or dislocation.      Recommend CT or MRI to further evaluate as appropriate.               ED BEDSIDE ULTRASOUND:   Performed by ED Physician - none    LABS:  Labs Reviewed - No data to display    All other labs were within normal range or not returned as of this dictation.    EMERGENCY DEPARTMENT COURSE and DIFFERENTIAL DIAGNOSIS/ MEDICAL DECISION MAKING:   Vitals:    Vitals:    06/03/24 0906   BP: 124/83   Pulse: 78   Resp: 16   Temp: 98.2 °F (36.8 °C)   TempSrc: Oral   SpO2: 97%   Weight: 69.9 kg (154 lb 1.6 oz)   Height: 1.626 m (5' 4\")         MDM      The patient presents to the emergency department the complaint of an injury to the right foot/ankle as noted above.  She is neurovascularly intact.  She is able to bear partial weight.    X-rays of the right foot and ankle were

## 2024-06-03 NOTE — DISCHARGE INSTRUCTIONS
Wear walker boot until seen by orthopedics.  May remove for bathing.    Follow-up with orthopedics in 1 to 2 days for reexamination.    Recommend minimal weightbearing.    Use ice to the affected area.  Avoid heat or heating pads.    May take Tylenol or ibuprofen as directed for pain.    If condition worsens or new symptoms develop, return immediately to the emergency department.

## 2024-06-04 ENCOUNTER — OFFICE VISIT (OUTPATIENT)
Dept: ORTHOPEDIC SURGERY | Age: 24
End: 2024-06-04
Payer: COMMERCIAL

## 2024-06-04 VITALS — BODY MASS INDEX: 26.29 KG/M2 | WEIGHT: 154 LBS | HEIGHT: 64 IN

## 2024-06-04 DIAGNOSIS — S92.354A CLOSED NONDISPLACED FRACTURE OF FIFTH METATARSAL BONE OF RIGHT FOOT, INITIAL ENCOUNTER: Primary | ICD-10-CM

## 2024-06-04 PROCEDURE — 99204 OFFICE O/P NEW MOD 45 MIN: CPT | Performed by: ORTHOPAEDIC SURGERY

## 2024-06-04 NOTE — PROGRESS NOTES
pain or black/bloody stools. The patient should have renal function testing per his medical provider periodically if the medication is taken on a regular basis.  The patient should be alert for any swelling in the lower extremities and should stop taking the medication immediately and contact their medical provider should this occur.  In addition, the patient should stop taking the medication immediately and contact their medical provider should there be any shortness of breath, fatigue and be evaluated in an emergency facility for any chest pain.  The patient expresses understanding of these issues and questions were answered.    Ankle ROM exercises discussed.     Progressive increase in activity as tolerated.    Follow up in 6 weeks as needed.          Abhilash Tovar MD  Orthopaedic Surgery and Sports Medicine     Disclaimer:  This note was generated with use of a verbal recognition program and an attempt was made to check for errors.  It is possible that there are still dictated errors within this office note.  If so, please bring any significant errors to my attention for an addendum.  All efforts were made to ensure that this office note is accurate.

## 2025-06-13 ENCOUNTER — HOSPITAL ENCOUNTER (EMERGENCY)
Age: 25
Discharge: HOME | End: 2025-06-13
Payer: COMMERCIAL

## 2025-06-13 ENCOUNTER — APPOINTMENT (OUTPATIENT)
Dept: GENERAL RADIOLOGY | Age: 25
End: 2025-06-13
Payer: COMMERCIAL

## 2025-06-13 VITALS
TEMPERATURE: 98.1 F | WEIGHT: 140.21 LBS | BODY MASS INDEX: 23.94 KG/M2 | SYSTOLIC BLOOD PRESSURE: 111 MMHG | DIASTOLIC BLOOD PRESSURE: 63 MMHG | HEART RATE: 70 BPM | RESPIRATION RATE: 16 BRPM | HEIGHT: 64 IN | OXYGEN SATURATION: 100 %
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
## 2025-06-13 DIAGNOSIS — R07.81 RIB PAIN ON LEFT SIDE: Primary | ICD-10-CM

## 2025-06-13 PROCEDURE — 71101 X-RAY EXAM UNILAT RIBS/CHEST: CPT

## 2025-06-13 PROCEDURE — 99283 EMERGENCY DEPT VISIT LOW MDM: CPT

## 2025-06-13 ASSESSMENT — ENCOUNTER SYMPTOMS
DIARRHEA: 0
ABDOMINAL PAIN: 0
BACK PAIN: 0
NAUSEA: 0
VOMITING: 0
COUGH: 0
COLOR CHANGE: 0
SHORTNESS OF BREATH: 0
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
## 2025-08-16 PROCEDURE — 99283 EMERGENCY DEPT VISIT LOW MDM: CPT

## 2025-08-17 ENCOUNTER — HOSPITAL ENCOUNTER (EMERGENCY)
Age: 25
Discharge: HOME | End: 2025-08-17
Payer: COMMERCIAL

## 2025-08-17 VITALS
RESPIRATION RATE: 16 BRPM | SYSTOLIC BLOOD PRESSURE: 115 MMHG | TEMPERATURE: 99 F | DIASTOLIC BLOOD PRESSURE: 88 MMHG | OXYGEN SATURATION: 100 % | HEART RATE: 75 BPM

## 2025-08-17 DIAGNOSIS — Z32.02 PREGNANCY EXAMINATION OR TEST, NEGATIVE RESULT: Primary | ICD-10-CM

## 2025-08-17 DIAGNOSIS — Z20.2 POSSIBLE EXPOSURE TO STI: ICD-10-CM

## 2025-08-17 LAB
BACTERIA GENITAL QL WET PREP: ABNORMAL
BILIRUB UR QL STRIP.AUTO: NEGATIVE
CLARITY UR: CLEAR
CLUE CELLS SPEC QL WET PREP: ABNORMAL
COLOR UR: YELLOW
EPI CELLS SPEC QL WET PREP: ABNORMAL
GLUCOSE UR STRIP.AUTO-MCNC: NEGATIVE MG/DL
HCG UR QL: NEGATIVE
HGB UR QL STRIP.AUTO: NEGATIVE
KETONES UR STRIP.AUTO-MCNC: NEGATIVE MG/DL
LEUKOCYTE ESTERASE UR QL STRIP.AUTO: NEGATIVE
NITRITE UR QL STRIP.AUTO: NEGATIVE
PH UR STRIP.AUTO: 6 [PH] (ref 5–8)
PROT UR STRIP.AUTO-MCNC: NEGATIVE MG/DL
RBC SPEC QL WET PREP: ABNORMAL
SP GR UR STRIP.AUTO: 1.02 (ref 1–1.03)
SPECIMEN SOURCE FLD: ABNORMAL
T VAGINALIS GENITAL QL WET PREP: ABNORMAL
UA COMPLETE W REFLEX CULTURE PNL UR: NORMAL
UA DIPSTICK W REFLEX MICRO PNL UR: NORMAL
URN SPEC COLLECT METH UR: NORMAL
UROBILINOGEN UR STRIP-ACNC: 1 E.U./DL
WBC SPEC QL WET PREP: ABNORMAL
YEAST GENITAL QL WET PREP: ABNORMAL

## 2025-08-17 PROCEDURE — 87210 SMEAR WET MOUNT SALINE/INK: CPT

## 2025-08-17 PROCEDURE — 84703 CHORIONIC GONADOTROPIN ASSAY: CPT

## 2025-08-17 PROCEDURE — 87491 CHLMYD TRACH DNA AMP PROBE: CPT

## 2025-08-17 PROCEDURE — 81003 URINALYSIS AUTO W/O SCOPE: CPT

## 2025-08-17 PROCEDURE — 87591 N.GONORRHOEAE DNA AMP PROB: CPT

## 2025-08-18 LAB
C TRACH DNA CVX QL NAA+PROBE: NEGATIVE
N GONORRHOEA DNA CERV MUCUS QL NAA+PROBE: NEGATIVE